# Patient Record
Sex: MALE | Race: WHITE | NOT HISPANIC OR LATINO | Employment: OTHER | URBAN - NONMETROPOLITAN AREA
[De-identification: names, ages, dates, MRNs, and addresses within clinical notes are randomized per-mention and may not be internally consistent; named-entity substitution may affect disease eponyms.]

---

## 2017-11-22 ENCOUNTER — TRANSCRIBE ORDERS (OUTPATIENT)
Dept: ADMINISTRATIVE | Facility: HOSPITAL | Age: 82
End: 2017-11-22

## 2017-11-22 DIAGNOSIS — M54.16 LUMBAR RADICULOPATHY: Primary | ICD-10-CM

## 2017-12-27 ENCOUNTER — HOSPITAL ENCOUNTER (OUTPATIENT)
Dept: NEUROLOGY | Facility: HOSPITAL | Age: 82
Discharge: HOME | End: 2017-12-27
Admitting: ORTHOPAEDIC SURGERY

## 2017-12-27 DIAGNOSIS — M54.16 LUMBAR RADICULOPATHY: ICD-10-CM

## 2017-12-27 PROCEDURE — 95886 MUSC TEST DONE W/N TEST COMP: CPT | Performed by: PSYCHIATRY & NEUROLOGY

## 2017-12-27 PROCEDURE — 95909 NRV CNDJ TST 5-6 STUDIES: CPT

## 2017-12-27 PROCEDURE — 95909 NRV CNDJ TST 5-6 STUDIES: CPT | Performed by: PSYCHIATRY & NEUROLOGY

## 2017-12-27 PROCEDURE — 95886 MUSC TEST DONE W/N TEST COMP: CPT

## 2018-02-26 ENCOUNTER — HOSPITAL ENCOUNTER (OUTPATIENT)
Dept: GENERAL RADIOLOGY | Facility: HOSPITAL | Age: 83
Discharge: HOME | End: 2018-02-26
Admitting: ORTHOPAEDIC SURGERY

## 2018-02-26 ENCOUNTER — APPOINTMENT (OUTPATIENT)
Dept: PREADMISSION TESTING | Facility: HOSPITAL | Age: 83
End: 2018-02-26

## 2018-02-26 VITALS
OXYGEN SATURATION: 99 % | DIASTOLIC BLOOD PRESSURE: 62 MMHG | HEART RATE: 80 BPM | SYSTOLIC BLOOD PRESSURE: 95 MMHG | HEIGHT: 68 IN | WEIGHT: 182.98 LBS | BODY MASS INDEX: 27.73 KG/M2 | RESPIRATION RATE: 16 BRPM

## 2018-02-26 LAB
ALBUMIN SERPL-MCNC: 4.1 G/DL (ref 3.5–5)
ALBUMIN/GLOB SERPL: 1.2 G/DL (ref 1.1–2.5)
ALP SERPL-CCNC: 76 U/L (ref 24–120)
ALT SERPL W P-5'-P-CCNC: 31 U/L (ref 0–54)
ANION GAP SERPL CALCULATED.3IONS-SCNC: 10 MMOL/L (ref 4–13)
APTT PPP: 32.5 SECONDS (ref 24.1–34.8)
AST SERPL-CCNC: 28 U/L (ref 7–45)
BACTERIA UR QL AUTO: ABNORMAL /HPF
BASOPHILS # BLD AUTO: 0.06 10*3/MM3 (ref 0–0.2)
BASOPHILS NFR BLD AUTO: 1.3 % (ref 0–2)
BILIRUB SERPL-MCNC: 0.6 MG/DL (ref 0.1–1)
BILIRUB UR QL STRIP: NEGATIVE
BUN BLD-MCNC: 9 MG/DL (ref 5–21)
BUN/CREAT SERPL: 13.8 (ref 7–25)
CALCIUM SPEC-SCNC: 9.3 MG/DL (ref 8.4–10.4)
CHLORIDE SERPL-SCNC: 95 MMOL/L (ref 98–110)
CLARITY UR: CLEAR
CO2 SERPL-SCNC: 31 MMOL/L (ref 24–31)
COLOR UR: YELLOW
CREAT BLD-MCNC: 0.65 MG/DL (ref 0.5–1.4)
DEPRECATED RDW RBC AUTO: 44.6 FL (ref 40–54)
EOSINOPHIL # BLD AUTO: 0.16 10*3/MM3 (ref 0–0.7)
EOSINOPHIL NFR BLD AUTO: 3.6 % (ref 0–4)
ERYTHROCYTE [DISTWIDTH] IN BLOOD BY AUTOMATED COUNT: 13.1 % (ref 12–15)
GFR SERPL CREATININE-BSD FRML MDRD: 118 ML/MIN/1.73
GLOBULIN UR ELPH-MCNC: 3.3 GM/DL
GLUCOSE BLD-MCNC: 108 MG/DL (ref 70–100)
GLUCOSE UR STRIP-MCNC: NEGATIVE MG/DL
HCT VFR BLD AUTO: 42.1 % (ref 40–52)
HGB BLD-MCNC: 14.4 G/DL (ref 14–18)
HGB UR QL STRIP.AUTO: ABNORMAL
HYALINE CASTS UR QL AUTO: ABNORMAL /LPF
IMM GRANULOCYTES # BLD: 0.01 10*3/MM3 (ref 0–0.03)
IMM GRANULOCYTES NFR BLD: 0.2 % (ref 0–5)
INR PPP: 1.06 (ref 0.91–1.09)
KETONES UR QL STRIP: NEGATIVE
LEUKOCYTE ESTERASE UR QL STRIP.AUTO: NEGATIVE
LYMPHOCYTES # BLD AUTO: 1.16 10*3/MM3 (ref 0.72–4.86)
LYMPHOCYTES NFR BLD AUTO: 25.9 % (ref 15–45)
MCH RBC QN AUTO: 31.9 PG (ref 28–32)
MCHC RBC AUTO-ENTMCNC: 34.2 G/DL (ref 33–36)
MCV RBC AUTO: 93.3 FL (ref 82–95)
MONOCYTES # BLD AUTO: 0.43 10*3/MM3 (ref 0.19–1.3)
MONOCYTES NFR BLD AUTO: 9.6 % (ref 4–12)
NEUTROPHILS # BLD AUTO: 2.66 10*3/MM3 (ref 1.87–8.4)
NEUTROPHILS NFR BLD AUTO: 59.4 % (ref 39–78)
NITRITE UR QL STRIP: NEGATIVE
NRBC BLD MANUAL-RTO: 0 /100 WBC (ref 0–0)
PH UR STRIP.AUTO: 7 [PH] (ref 5–8)
PLATELET # BLD AUTO: 210 10*3/MM3 (ref 130–400)
PMV BLD AUTO: 9 FL (ref 6–12)
POTASSIUM BLD-SCNC: 4.8 MMOL/L (ref 3.5–5.3)
PROT SERPL-MCNC: 7.4 G/DL (ref 6.3–8.7)
PROT UR QL STRIP: NEGATIVE
PROTHROMBIN TIME: 14.1 SECONDS (ref 11.9–14.6)
RBC # BLD AUTO: 4.51 10*6/MM3 (ref 4.8–5.9)
RBC # UR: ABNORMAL /HPF
REF LAB TEST METHOD: ABNORMAL
SODIUM BLD-SCNC: 136 MMOL/L (ref 135–145)
SP GR UR STRIP: 1.01 (ref 1–1.03)
SQUAMOUS #/AREA URNS HPF: ABNORMAL /HPF
UROBILINOGEN UR QL STRIP: ABNORMAL
WBC NRBC COR # BLD: 4.48 10*3/MM3 (ref 4.8–10.8)
WBC UR QL AUTO: ABNORMAL /HPF

## 2018-02-26 PROCEDURE — 85610 PROTHROMBIN TIME: CPT | Performed by: ORTHOPAEDIC SURGERY

## 2018-02-26 PROCEDURE — 85025 COMPLETE CBC W/AUTO DIFF WBC: CPT | Performed by: ORTHOPAEDIC SURGERY

## 2018-02-26 PROCEDURE — 93005 ELECTROCARDIOGRAM TRACING: CPT

## 2018-02-26 PROCEDURE — 71045 X-RAY EXAM CHEST 1 VIEW: CPT

## 2018-02-26 PROCEDURE — 81001 URINALYSIS AUTO W/SCOPE: CPT | Performed by: ORTHOPAEDIC SURGERY

## 2018-02-26 PROCEDURE — 85730 THROMBOPLASTIN TIME PARTIAL: CPT | Performed by: ORTHOPAEDIC SURGERY

## 2018-02-26 PROCEDURE — 36415 COLL VENOUS BLD VENIPUNCTURE: CPT

## 2018-02-26 PROCEDURE — 80053 COMPREHEN METABOLIC PANEL: CPT | Performed by: ORTHOPAEDIC SURGERY

## 2018-02-26 PROCEDURE — 93010 ELECTROCARDIOGRAM REPORT: CPT | Performed by: INTERNAL MEDICINE

## 2018-02-26 RX ORDER — CYCLOBENZAPRINE HCL 10 MG
10 TABLET ORAL 3 TIMES DAILY PRN
COMMUNITY

## 2018-02-26 RX ORDER — DIGOXIN 125 MCG
125 TABLET ORAL
COMMUNITY

## 2018-02-26 RX ORDER — LOSARTAN POTASSIUM 50 MG/1
50 TABLET ORAL DAILY
COMMUNITY

## 2018-02-26 RX ORDER — FUROSEMIDE 20 MG/1
20 TABLET ORAL 2 TIMES DAILY
COMMUNITY

## 2018-02-26 RX ORDER — ASPIRIN 81 MG/1
81 TABLET, CHEWABLE ORAL DAILY
COMMUNITY

## 2018-02-26 RX ORDER — HYDROCODONE BITARTRATE AND ACETAMINOPHEN 7.5; 325 MG/1; MG/1
1 TABLET ORAL EVERY 6 HOURS PRN
COMMUNITY
End: 2018-03-14 | Stop reason: HOSPADM

## 2018-02-26 NOTE — DISCHARGE INSTRUCTIONS
DAY OF SURGERY INSTRUCTIONS        YOUR SURGEON: DR DENNIS    PROCEDURE: LEFT LATERAL LUMBAR INTERBODY FUSION L3-L5    DATE OF SURGERY: MARCH 12, 2018    ARRIVAL TIME: AS DIRECTED BY OFFICE    DAY OF SURGERY TAKE ONLY THESE MEDICATIONS: METOPROLOL        BEFORE YOU COME TO THE HOSPITAL  (Pre-op instructions)  • Do not eat, drink, smoke or chew gum after midnight the night before surgery.  This also includes no mints.  • Morning of surgery take only the medicines you have been instructed with a sip of water unless otherwise instructed  by your physician.  • Do not shave, wear makeup or dark nail polish.  • Remove all jewelry including rings.  • Leave anything you consider valuable at home.  • Leave your suitcase in the car until after your surgery.  • Bring the following with you if applicable:  o Picture ID and insurance, Medicare or Medicaid cards  o Co-pay/deductible required by insurance (cash, check, credit card)  o Copy of advance directive, living will or power-of- documents if not brought to PAT  o CPAP or BIPAP mask and tubing  o Relaxation aids (MP3 player, book, magazine)  • On the day of surgery check in at registration located at the main entrance of the hospital.       Outpatient Surgery Guidelines, Adult  Outpatient procedures are those for which the person having the procedure is allowed to go home the same day as the procedure. Various procedures are done on an outpatient basis. You should follow some general guidelines if you will be having an outpatient procedure.  LET YOUR HEALTH CARE PROVIDER KNOW ABOUT:  · Any allergies you have.  · All medicines you are taking, including vitamins, herbs, eye drops, creams, and over-the-counter medicines.  · Previous problems you or members of your family have had with the use of anesthetics.  · Any blood disorders you have.  · Previous surgeries you have had.  · Medical conditions you have.  RISKS AND COMPLICATIONS  Your health care provider will  discuss possible risks and complications with you before surgery. Common risks and complications include:    · Problems due to the use of anesthetics.  · Blood loss and replacement (does not apply to minor surgical procedures).  · Temporary increase in pain due to surgery.  · Uncorrected pain or problems that the surgery was meant to correct.  · Infection.  · New damage.  BEFORE THE PROCEDURE  · Ask your health care provider about changing or stopping your regular medicines. You may need to stop taking certain medicines in the days or weeks before the procedure.  · Stop smoking at least 2 weeks before surgery. This lowers your risk for complications during and after surgery. Ask your health care provider for help with this if needed.  · Eat your usual meals and a light supper the day before surgery. Continue fluid intake. Do not drink alcohol.  · Do not eat or drink after midnight the night before your surgery.   · Arrange for someone to take you home and to stay with you for 24 hours after the procedure. Medicine given for your procedure may affect your ability to drive or to care for yourself.  · Call your health care provider's office if you develop an illness or problem that may prevent you from safely having your procedure.  AFTER THE PROCEDURE  After surgery, you will be taken to a recovery area, where your progress will be monitored. If there are no complications, you will be allowed to go home when you are awake, stable, and taking fluids well. You may have numbness around the surgical site. Healing will take some time. You will have tenderness at the surgical site and may have some swelling and bruising. You may also have some nausea.  HOME CARE INSTRUCTIONS  · Do not drive for 24 hours, or as directed by your health care provider. Do not drive while taking prescription pain medicines.  · Do not drink alcohol for 24 hours.  · Do not make important decisions or sign legal documents for 24 hours.  · You may  resume a normal diet and activities as directed.  · Do not lift anything heavier than 10 pounds (4.5 kg) or play contact sports until your health care provider says it is okay.  · Change your bandages (dressings) as directed.  · Only take over-the-counter or prescription medicines as directed by your health care provider.  · Follow up with your health care provider as directed.  SEEK MEDICAL CARE IF:  · You have increased bleeding (more than a small spot) from the surgical site.  · You have redness, swelling, or increasing pain in the wound.  · You see pus coming from the wound.  · You have a fever.  · You notice a bad smell coming from the wound or dressing.  · You feel lightheaded or faint.  · You develop a rash.  · You have trouble breathing.  · You develop allergies.  MAKE SURE YOU:  · Understand these instructions.  · Will watch your condition.  · Will get help right away if you are not doing well or get worse.     This information is not intended to replace advice given to you by your health care provider. Make sure you discuss any questions you have with your health care provider.     Document Released: 09/12/2002 Document Revised: 05/03/2016 Document Reviewed: 05/22/2014  [employer] Interactive Patient Education ©2016 [employer]       Fall Prevention in Hospitals, Adult  As a hospital patient, your condition and the treatments you receive can increase your risk for falls. Some additional risk factors for falls in a hospital include:  · Being in an unfamiliar environment.  · Being on bed rest.  · Your surgery.  · Taking certain medicines.  · Your tubing requirements, such as intravenous (IV) therapy or catheters.  It is important that you learn how to decrease fall risks while at the hospital. Below are important tips that can help prevent falls.  SAFETY TIPS FOR PREVENTING FALLS  Talk about your risk of falling.  · Ask your health care provider why you are at risk for falling. Is it your medicine, illness,  tubing placement, or something else?  · Make a plan with your health care provider to keep you safe from falls.  · Ask your health care provider or pharmacist about side effects of your medicines. Some medicines can make you dizzy or affect your coordination.  Ask for help.  · Ask for help before getting out of bed. You may need to press your call button.  · Ask for assistance in getting safely to the toilet.  · Ask for a walker or cane to be put at your bedside. Ask that most of the side rails on your bed be placed up before your health care provider leaves the room.  · Ask family or friends to sit with you.  · Ask for things that are out of your reach, such as your glasses, hearing aids, telephone, bedside table, or call button.  Follow these tips to avoid falling:  · Stay lying or seated, rather than standing, while waiting for help.  · Wear rubber-soled slippers or shoes whenever you walk in the hospital.  · Avoid quick, sudden movements.  ¨ Change positions slowly.  ¨ Sit on the side of your bed before standing.  ¨ Stand up slowly and wait before you start to walk.  · Let your health care provider know if there is a spill on the floor.  · Pay careful attention to the medical equipment, electrical cords, and tubes around you.  · When you need help, use your call button by your bed or in the bathroom. Wait for one of your health care providers to help you.  · If you feel dizzy or unsure of your footing, return to bed and wait for assistance.  · Avoid being distracted by the TV, telephone, or another person in your room.  · Do not lean or support yourself on rolling objects, such as IV poles or bedside tables.     This information is not intended to replace advice given to you by your health care provider. Make sure you discuss any questions you have with your health care provider.     Document Released: 12/15/2001 Document Revised: 01/08/2016 Document Reviewed: 08/25/2013  [employer] Interactive Patient Education  ©2016 [employer]       Surgical Site Infections FAQs  What is a Surgical Site Infection (SSI)?  A surgical site infection is an infection that occurs after surgery in the part of the body where the surgery took place. Most patients who have surgery do not develop an infection. However, infections develop in about 1 to 3 out of every 100 patients who have surgery.  Some of the common symptoms of a surgical site infection are:  · Redness and pain around the area where you had surgery  · Drainage of cloudy fluid from your surgical wound  · Fever  Can SSIs be treated?  Yes. Most surgical site infections can be treated with antibiotics. The antibiotic given to you depends on the bacteria (germs) causing the infection. Sometimes patients with SSIs also need another surgery to treat the infection.  What are some of the things that hospitals are doing to prevent SSIs?  To prevent SSIs, doctors, nurses, and other healthcare providers:  · Clean their hands and arms up to their elbows with an antiseptic agent just before the surgery.  · Clean their hands with soap and water or an alcohol-based hand rub before and after caring for each patient.  · May remove some of your hair immediately before your surgery using electric clippers if the hair is in the same area where the procedure will occur. They should not shave you with a razor.  · Wear special hair covers, masks, gowns, and gloves during surgery to keep the surgery area clean.  · Give you antibiotics before your surgery starts. In most cases, you should get antibiotics within 60 minutes before the surgery starts and the antibiotics should be stopped within 24 hours after surgery.  · Clean the skin at the site of your surgery with a special soap that kills germs.  What can I do to help prevent SSIs?  Before your surgery:  · Tell your doctor about other medical problems you may have. Health problems such as allergies, diabetes, and obesity could affect your surgery and  your treatment.  · Quit smoking. Patients who smoke get more infections. Talk to your doctor about how you can quit before your surgery.  · Do not shave near where you will have surgery. Shaving with a razor can irritate your skin and make it easier to develop an infection.  At the time of your surgery:  · Speak up if someone tries to shave you with a razor before surgery. Ask why you need to be shaved and talk with your surgeon if you have any concerns.  · Ask if you will get antibiotics before surgery.  After your surgery:  · Make sure that your healthcare providers clean their hands before examining you, either with soap and water or an alcohol-based hand rub.  · If you do not see your providers clean their hands, please ask them to do so.  · Family and friends who visit you should not touch the surgical wound or dressings.  · Family and friends should clean their hands with soap and water or an alcohol-based hand rub before and after visiting you. If you do not see them clean their hands, ask them to clean their hands.  What do I need to do when I go home from the hospital?  · Before you go home, your doctor or nurse should explain everything you need to know about taking care of your wound. Make sure you understand how to care for your wound before you leave the hospital.  · Always clean your hands before and after caring for your wound.  · Before you go home, make sure you know who to contact if you have questions or problems after you get home.  · If you have any symptoms of an infection, such as redness and pain at the surgery site, drainage, or fever, call your doctor immediately.  If you have additional questions, please ask your doctor or nurse.  Developed and co-sponsored by The Society for Healthcare Epidemiology of [place] (SHEA); Infectious Diseases Society of [place] (IDSA); American Hospital Association; Association for Professionals in Infection Control and Epidemiology (APIC); Centers for Disease  Control and Prevention (CDC); and The Joint Commission.     This information is not intended to replace advice given to you by your health care provider. Make sure you discuss any questions you have with your health care provider.     Document Released: 12/23/2014 Document Revised: 01/08/2016 Document Reviewed: 03/02/2016  [employer] Interactive Patient Education ©2016 [employer]       [facility]  CHG 4% Patient Instruction Sheet    Preparing the Skin Before Surgery  Preparing or “prepping” skin before surgery can reduce the risk of infection at the surgical site. To make the process easier,[facility] has chosen 4% Chlorhexidine Gluconate (CHG) antiseptic solution.   The steps below outline the prepping process and should be carefully followed.                                                                                                                                                      Prep the skin at the following time(s):                                                      We recommend you shower the night before surgery, and again the morning of surgery with the 4% CHG antiseptic solution using half of the bottle and a cloth each time.  Dress in clean clothes/sleepwear after showering.  See instructions below for application.          Do not apply any lotions or moisturizers.       Do not shave the area to be prepped for at least 2 days prior to surgery.    Clipping the hair may be done immediately prior to your surgery at the hospital    if needed.    Directions:  Thoroughly rinse your body with water.  Apply 4% CHG to a cloth and wash skin gently, paying special attention to the operative site.  Rinse again thoroughly.  Once you have begun using this product do not apply anything else to your skin. If itching or redness persists, rinse affected areas and discontinue use.    When using this product:  • Keep out of eyes, ears, and mouth.  • If solution should contact these areas, rinse out promptly  and thoroughly with water.  • For external use only.  • Do not use in genital area, on your face or head.      PATIENT/FAMILY/RESPONSIBLE PARTY VERBALIZES UNDERSTANDING OF ABOVE EDUCATION.  COPY OF PAIN SCALE GIVEN AND REVIEWED WITH VERBALIZED UNDERSTANDING.

## 2018-02-26 NOTE — NURSING NOTE
PT PRESSURE IN PREWORK 95/62 HE STATES THAT HE JUST TOOK HIS BLOOD PRESSURE MEDICATIONS AND THAT HE RUNS THIS NORMALLY IN THE MORNING UNTIL MID AFTERNOON. THIS IS HIS BASELINE. HE ALSO STATES THAT HE HAS A HX OF A FIB AS SHOWN ON HIS EKG. HE IS ON ASPIRIN BUT REFUSES BLOOD THINNERS. I REQUESTED THAT HE SPEAK WITH DR PENALOZA AND DR DENNIS PRIOR TO SURGERY ABOUT WHETHER THEY WISH HIM TO STOP ASPIRIN PRIOR TO SURGERY. HE SAID HE WOULD SPEAK TO BOTH

## 2018-03-09 ENCOUNTER — ANESTHESIA EVENT (OUTPATIENT)
Dept: PERIOP | Facility: HOSPITAL | Age: 83
End: 2018-03-09

## 2018-03-12 ENCOUNTER — ANESTHESIA (OUTPATIENT)
Dept: PERIOP | Facility: HOSPITAL | Age: 83
End: 2018-03-12

## 2018-03-12 ENCOUNTER — HOSPITAL ENCOUNTER (INPATIENT)
Facility: HOSPITAL | Age: 83
LOS: 2 days | Discharge: HOME HEALTH | End: 2018-03-14
Attending: ORTHOPAEDIC SURGERY | Admitting: ORTHOPAEDIC SURGERY

## 2018-03-12 ENCOUNTER — APPOINTMENT (OUTPATIENT)
Dept: GENERAL RADIOLOGY | Facility: HOSPITAL | Age: 83
End: 2018-03-12

## 2018-03-12 DIAGNOSIS — Z78.9 IMPAIRED MOBILITY AND ADLS: ICD-10-CM

## 2018-03-12 DIAGNOSIS — Z74.09 IMPAIRED MOBILITY AND ADLS: ICD-10-CM

## 2018-03-12 DIAGNOSIS — R26.89 IMPAIRED GAIT AND MOBILITY: ICD-10-CM

## 2018-03-12 PROBLEM — M48.061 LUMBAR STENOSIS: Status: ACTIVE | Noted: 2018-03-12

## 2018-03-12 LAB
ABO GROUP BLD: NORMAL
BLD GP AB SCN SERPL QL: NEGATIVE
RH BLD: POSITIVE

## 2018-03-12 PROCEDURE — 25010000002 PROPOFOL 10 MG/ML EMULSION: Performed by: NURSE ANESTHETIST, CERTIFIED REGISTERED

## 2018-03-12 PROCEDURE — 86900 BLOOD TYPING SEROLOGIC ABO: CPT | Performed by: ORTHOPAEDIC SURGERY

## 2018-03-12 PROCEDURE — L0631 LSO SAG R AN/POS PNL PRE CST: HCPCS

## 2018-03-12 PROCEDURE — 76000 FLUOROSCOPY <1 HR PHYS/QHP: CPT

## 2018-03-12 PROCEDURE — 25010000002 PROPOFOL 1000 MG/ML EMULSION: Performed by: NURSE ANESTHETIST, CERTIFIED REGISTERED

## 2018-03-12 PROCEDURE — C1713 ANCHOR/SCREW BN/BN,TIS/BN: HCPCS | Performed by: ORTHOPAEDIC SURGERY

## 2018-03-12 PROCEDURE — 25010000002 HYDROMORPHONE PER 4 MG: Performed by: ANESTHESIOLOGY

## 2018-03-12 PROCEDURE — 0SG10A0 FUSION OF 2 OR MORE LUMBAR VERTEBRAL JOINTS WITH INTERBODY FUSION DEVICE, ANTERIOR APPROACH, ANTERIOR COLUMN, OPEN APPROACH: ICD-10-PCS | Performed by: ORTHOPAEDIC SURGERY

## 2018-03-12 PROCEDURE — 25010000002 HEPARIN (PORCINE) PER 1000 UNITS: Performed by: ORTHOPAEDIC SURGERY

## 2018-03-12 PROCEDURE — 25010000002 HYDROMORPHONE PER 4 MG: Performed by: ORTHOPAEDIC SURGERY

## 2018-03-12 PROCEDURE — 25010000002 HYDROMORPHONE PER 4 MG: Performed by: NURSE ANESTHETIST, CERTIFIED REGISTERED

## 2018-03-12 PROCEDURE — 25010000002 SUCCINYLCHOLINE PER 20 MG: Performed by: NURSE ANESTHETIST, CERTIFIED REGISTERED

## 2018-03-12 PROCEDURE — 94799 UNLISTED PULMONARY SVC/PX: CPT

## 2018-03-12 PROCEDURE — 25010000002 FENTANYL CITRATE (PF) 250 MCG/5ML SOLUTION: Performed by: NURSE ANESTHETIST, CERTIFIED REGISTERED

## 2018-03-12 PROCEDURE — 86850 RBC ANTIBODY SCREEN: CPT | Performed by: ORTHOPAEDIC SURGERY

## 2018-03-12 PROCEDURE — 0SB20ZZ EXCISION OF LUMBAR VERTEBRAL DISC, OPEN APPROACH: ICD-10-PCS | Performed by: ORTHOPAEDIC SURGERY

## 2018-03-12 PROCEDURE — 86901 BLOOD TYPING SEROLOGIC RH(D): CPT | Performed by: ORTHOPAEDIC SURGERY

## 2018-03-12 PROCEDURE — 72100 X-RAY EXAM L-S SPINE 2/3 VWS: CPT

## 2018-03-12 DEVICE — IMPLANTABLE DEVICE: Type: IMPLANTABLE DEVICE | Site: SPINE LUMBAR | Status: FUNCTIONAL

## 2018-03-12 DEVICE — BONE FILLER VOID NANOSS BIOACTIVE 3D 20CC: Type: IMPLANTABLE DEVICE | Site: SPINE LUMBAR | Status: FUNCTIONAL

## 2018-03-12 RX ORDER — SODIUM CHLORIDE, SODIUM LACTATE, POTASSIUM CHLORIDE, CALCIUM CHLORIDE 600; 310; 30; 20 MG/100ML; MG/100ML; MG/100ML; MG/100ML
30 INJECTION, SOLUTION INTRAVENOUS CONTINUOUS
Status: DISCONTINUED | OUTPATIENT
Start: 2018-03-12 | End: 2018-03-12

## 2018-03-12 RX ORDER — SODIUM CHLORIDE 9 MG/ML
75 INJECTION, SOLUTION INTRAVENOUS CONTINUOUS
Status: DISPENSED | OUTPATIENT
Start: 2018-03-12 | End: 2018-03-13

## 2018-03-12 RX ORDER — MEPERIDINE HYDROCHLORIDE 25 MG/ML
12.5 INJECTION INTRAMUSCULAR; INTRAVENOUS; SUBCUTANEOUS
Status: DISCONTINUED | OUTPATIENT
Start: 2018-03-12 | End: 2018-03-12 | Stop reason: HOSPADM

## 2018-03-12 RX ORDER — ONDANSETRON 2 MG/ML
4 INJECTION INTRAMUSCULAR; INTRAVENOUS ONCE AS NEEDED
Status: DISCONTINUED | OUTPATIENT
Start: 2018-03-12 | End: 2018-03-12 | Stop reason: HOSPADM

## 2018-03-12 RX ORDER — FENTANYL CITRATE 50 UG/ML
25 INJECTION, SOLUTION INTRAMUSCULAR; INTRAVENOUS
Status: DISCONTINUED | OUTPATIENT
Start: 2018-03-12 | End: 2018-03-12 | Stop reason: HOSPADM

## 2018-03-12 RX ORDER — HEPARIN SODIUM 10000 [USP'U]/ML
INJECTION, SOLUTION INTRAVENOUS; SUBCUTANEOUS AS NEEDED
Status: DISCONTINUED | OUTPATIENT
Start: 2018-03-12 | End: 2018-03-12 | Stop reason: HOSPADM

## 2018-03-12 RX ORDER — ONDANSETRON 4 MG/1
4 TABLET, ORALLY DISINTEGRATING ORAL EVERY 6 HOURS PRN
Status: DISCONTINUED | OUTPATIENT
Start: 2018-03-12 | End: 2018-03-14 | Stop reason: HOSPADM

## 2018-03-12 RX ORDER — DIPHENHYDRAMINE HCL 25 MG
25 CAPSULE ORAL NIGHTLY PRN
Status: DISCONTINUED | OUTPATIENT
Start: 2018-03-12 | End: 2018-03-14 | Stop reason: HOSPADM

## 2018-03-12 RX ORDER — FAMOTIDINE 20 MG/1
20 TABLET, FILM COATED ORAL EVERY 12 HOURS SCHEDULED
Status: DISCONTINUED | OUTPATIENT
Start: 2018-03-12 | End: 2018-03-14 | Stop reason: HOSPADM

## 2018-03-12 RX ORDER — LIDOCAINE HYDROCHLORIDE 20 MG/ML
INJECTION, SOLUTION INFILTRATION; PERINEURAL AS NEEDED
Status: DISCONTINUED | OUTPATIENT
Start: 2018-03-12 | End: 2018-03-12 | Stop reason: SURG

## 2018-03-12 RX ORDER — OXYCODONE HCL 20 MG/1
20 TABLET, FILM COATED, EXTENDED RELEASE ORAL ONCE
Status: COMPLETED | OUTPATIENT
Start: 2018-03-12 | End: 2018-03-12

## 2018-03-12 RX ORDER — FAMOTIDINE 10 MG/ML
20 INJECTION, SOLUTION INTRAVENOUS EVERY 12 HOURS SCHEDULED
Status: DISCONTINUED | OUTPATIENT
Start: 2018-03-12 | End: 2018-03-14 | Stop reason: HOSPADM

## 2018-03-12 RX ORDER — ROCURONIUM BROMIDE 10 MG/ML
INJECTION, SOLUTION INTRAVENOUS AS NEEDED
Status: DISCONTINUED | OUTPATIENT
Start: 2018-03-12 | End: 2018-03-12 | Stop reason: SURG

## 2018-03-12 RX ORDER — LOSARTAN POTASSIUM 50 MG/1
50 TABLET ORAL DAILY
Status: DISCONTINUED | OUTPATIENT
Start: 2018-03-12 | End: 2018-03-14 | Stop reason: HOSPADM

## 2018-03-12 RX ORDER — NALOXONE HCL 0.4 MG/ML
0.4 VIAL (ML) INJECTION AS NEEDED
Status: DISCONTINUED | OUTPATIENT
Start: 2018-03-12 | End: 2018-03-12 | Stop reason: HOSPADM

## 2018-03-12 RX ORDER — MAGNESIUM HYDROXIDE 1200 MG/15ML
LIQUID ORAL AS NEEDED
Status: DISCONTINUED | OUTPATIENT
Start: 2018-03-12 | End: 2018-03-12 | Stop reason: HOSPADM

## 2018-03-12 RX ORDER — SODIUM CHLORIDE 0.9 % (FLUSH) 0.9 %
1-10 SYRINGE (ML) INJECTION AS NEEDED
Status: DISCONTINUED | OUTPATIENT
Start: 2018-03-12 | End: 2018-03-12

## 2018-03-12 RX ORDER — DIGOXIN 125 MCG
125 TABLET ORAL
Status: DISCONTINUED | OUTPATIENT
Start: 2018-03-12 | End: 2018-03-14 | Stop reason: HOSPADM

## 2018-03-12 RX ORDER — SODIUM CHLORIDE 0.9 % (FLUSH) 0.9 %
1-10 SYRINGE (ML) INJECTION AS NEEDED
Status: DISCONTINUED | OUTPATIENT
Start: 2018-03-12 | End: 2018-03-14 | Stop reason: HOSPADM

## 2018-03-12 RX ORDER — ONDANSETRON 2 MG/ML
4 INJECTION INTRAMUSCULAR; INTRAVENOUS EVERY 6 HOURS PRN
Status: DISCONTINUED | OUTPATIENT
Start: 2018-03-12 | End: 2018-03-12 | Stop reason: SDUPTHER

## 2018-03-12 RX ORDER — IPRATROPIUM BROMIDE AND ALBUTEROL SULFATE 2.5; .5 MG/3ML; MG/3ML
3 SOLUTION RESPIRATORY (INHALATION) ONCE AS NEEDED
Status: DISCONTINUED | OUTPATIENT
Start: 2018-03-12 | End: 2018-03-12 | Stop reason: HOSPADM

## 2018-03-12 RX ORDER — FUROSEMIDE 20 MG/1
20 TABLET ORAL
Status: DISCONTINUED | OUTPATIENT
Start: 2018-03-12 | End: 2018-03-14 | Stop reason: HOSPADM

## 2018-03-12 RX ORDER — LABETALOL HYDROCHLORIDE 5 MG/ML
5 INJECTION, SOLUTION INTRAVENOUS
Status: DISCONTINUED | OUTPATIENT
Start: 2018-03-12 | End: 2018-03-12 | Stop reason: HOSPADM

## 2018-03-12 RX ORDER — HYDROMORPHONE HCL 110MG/55ML
PATIENT CONTROLLED ANALGESIA SYRINGE INTRAVENOUS AS NEEDED
Status: DISCONTINUED | OUTPATIENT
Start: 2018-03-12 | End: 2018-03-12 | Stop reason: SURG

## 2018-03-12 RX ORDER — DEXTROSE MONOHYDRATE 25 G/50ML
12.5 INJECTION, SOLUTION INTRAVENOUS AS NEEDED
Status: DISCONTINUED | OUTPATIENT
Start: 2018-03-12 | End: 2018-03-14 | Stop reason: HOSPADM

## 2018-03-12 RX ORDER — SODIUM CHLORIDE, SODIUM LACTATE, POTASSIUM CHLORIDE, CALCIUM CHLORIDE 600; 310; 30; 20 MG/100ML; MG/100ML; MG/100ML; MG/100ML
100 INJECTION, SOLUTION INTRAVENOUS CONTINUOUS PRN
Status: DISCONTINUED | OUTPATIENT
Start: 2018-03-12 | End: 2018-03-12 | Stop reason: HOSPADM

## 2018-03-12 RX ORDER — SODIUM CHLORIDE 0.9 % (FLUSH) 0.9 %
1-10 SYRINGE (ML) INJECTION AS NEEDED
Status: DISCONTINUED | OUTPATIENT
Start: 2018-03-12 | End: 2018-03-12 | Stop reason: HOSPADM

## 2018-03-12 RX ORDER — MIDAZOLAM HYDROCHLORIDE 1 MG/ML
1 INJECTION INTRAMUSCULAR; INTRAVENOUS
Status: DISCONTINUED | OUTPATIENT
Start: 2018-03-12 | End: 2018-03-12 | Stop reason: HOSPADM

## 2018-03-12 RX ORDER — PROPOFOL 10 MG/ML
VIAL (ML) INTRAVENOUS AS NEEDED
Status: DISCONTINUED | OUTPATIENT
Start: 2018-03-12 | End: 2018-03-12 | Stop reason: SURG

## 2018-03-12 RX ORDER — MIDAZOLAM HYDROCHLORIDE 1 MG/ML
2 INJECTION INTRAMUSCULAR; INTRAVENOUS
Status: DISCONTINUED | OUTPATIENT
Start: 2018-03-12 | End: 2018-03-12 | Stop reason: HOSPADM

## 2018-03-12 RX ORDER — FENTANYL CITRATE 50 UG/ML
INJECTION, SOLUTION INTRAMUSCULAR; INTRAVENOUS AS NEEDED
Status: DISCONTINUED | OUTPATIENT
Start: 2018-03-12 | End: 2018-03-12 | Stop reason: SURG

## 2018-03-12 RX ORDER — HYDRALAZINE HYDROCHLORIDE 20 MG/ML
5 INJECTION INTRAMUSCULAR; INTRAVENOUS
Status: DISCONTINUED | OUTPATIENT
Start: 2018-03-12 | End: 2018-03-12 | Stop reason: HOSPADM

## 2018-03-12 RX ORDER — ONDANSETRON 2 MG/ML
4 INJECTION INTRAMUSCULAR; INTRAVENOUS EVERY 6 HOURS PRN
Status: DISCONTINUED | OUTPATIENT
Start: 2018-03-12 | End: 2018-03-14 | Stop reason: HOSPADM

## 2018-03-12 RX ORDER — DIPHENHYDRAMINE HYDROCHLORIDE 50 MG/ML
12.5 INJECTION INTRAMUSCULAR; INTRAVENOUS
Status: DISCONTINUED | OUTPATIENT
Start: 2018-03-12 | End: 2018-03-12 | Stop reason: HOSPADM

## 2018-03-12 RX ORDER — SODIUM CHLORIDE, SODIUM LACTATE, POTASSIUM CHLORIDE, CALCIUM CHLORIDE 600; 310; 30; 20 MG/100ML; MG/100ML; MG/100ML; MG/100ML
9 INJECTION, SOLUTION INTRAVENOUS CONTINUOUS
Status: DISCONTINUED | OUTPATIENT
Start: 2018-03-12 | End: 2018-03-12 | Stop reason: HOSPADM

## 2018-03-12 RX ORDER — ONDANSETRON 4 MG/1
4 TABLET, FILM COATED ORAL EVERY 6 HOURS PRN
Status: DISCONTINUED | OUTPATIENT
Start: 2018-03-12 | End: 2018-03-14 | Stop reason: HOSPADM

## 2018-03-12 RX ORDER — CYCLOBENZAPRINE HCL 10 MG
10 TABLET ORAL 3 TIMES DAILY PRN
Status: DISCONTINUED | OUTPATIENT
Start: 2018-03-12 | End: 2018-03-14 | Stop reason: HOSPADM

## 2018-03-12 RX ORDER — SODIUM CHLORIDE 9 MG/ML
75 INJECTION, SOLUTION INTRAVENOUS CONTINUOUS
Status: DISPENSED | OUTPATIENT
Start: 2018-03-12 | End: 2018-03-14

## 2018-03-12 RX ORDER — MORPHINE SULFATE 2 MG/ML
2 INJECTION, SOLUTION INTRAMUSCULAR; INTRAVENOUS
Status: DISCONTINUED | OUTPATIENT
Start: 2018-03-12 | End: 2018-03-12 | Stop reason: HOSPADM

## 2018-03-12 RX ORDER — SUCCINYLCHOLINE CHLORIDE 20 MG/ML
INJECTION INTRAMUSCULAR; INTRAVENOUS AS NEEDED
Status: DISCONTINUED | OUTPATIENT
Start: 2018-03-12 | End: 2018-03-12 | Stop reason: SURG

## 2018-03-12 RX ORDER — OXYCODONE AND ACETAMINOPHEN 10; 325 MG/1; MG/1
2 TABLET ORAL EVERY 4 HOURS PRN
Status: DISCONTINUED | OUTPATIENT
Start: 2018-03-12 | End: 2018-03-14 | Stop reason: HOSPADM

## 2018-03-12 RX ADMIN — FENTANYL CITRATE 100 MCG: 50 INJECTION INTRAMUSCULAR; INTRAVENOUS at 09:41

## 2018-03-12 RX ADMIN — HYDROMORPHONE HYDROCHLORIDE 0.25 MG: 2 INJECTION, SOLUTION INTRAMUSCULAR; INTRAVENOUS; SUBCUTANEOUS at 10:50

## 2018-03-12 RX ADMIN — CYCLOBENZAPRINE HYDROCHLORIDE 10 MG: 10 TABLET, FILM COATED ORAL at 17:47

## 2018-03-12 RX ADMIN — PROPOFOL 130 MCG/KG/MIN: 10 INJECTION, EMULSION INTRAVENOUS at 11:16

## 2018-03-12 RX ADMIN — FAMOTIDINE 20 MG: 10 INJECTION INTRAVENOUS at 14:27

## 2018-03-12 RX ADMIN — METOPROLOL TARTRATE 25 MG: 25 TABLET, FILM COATED ORAL at 21:46

## 2018-03-12 RX ADMIN — HYDROMORPHONE HYDROCHLORIDE 0.5 MG: 1 INJECTION, SOLUTION INTRAMUSCULAR; INTRAVENOUS; SUBCUTANEOUS at 12:22

## 2018-03-12 RX ADMIN — LIDOCAINE HYDROCHLORIDE 0.5 ML: 10 INJECTION, SOLUTION EPIDURAL; INFILTRATION; INTRACAUDAL; PERINEURAL at 08:33

## 2018-03-12 RX ADMIN — SODIUM CHLORIDE, POTASSIUM CHLORIDE, SODIUM LACTATE AND CALCIUM CHLORIDE 100 ML/HR: 600; 310; 30; 20 INJECTION, SOLUTION INTRAVENOUS at 08:35

## 2018-03-12 RX ADMIN — LOSARTAN POTASSIUM 50 MG: 50 TABLET ORAL at 14:27

## 2018-03-12 RX ADMIN — DIGOXIN 125 MCG: 0.12 TABLET ORAL at 14:27

## 2018-03-12 RX ADMIN — LIDOCAINE HYDROCHLORIDE 100 MG: 20 INJECTION, SOLUTION INFILTRATION; PERINEURAL at 09:41

## 2018-03-12 RX ADMIN — FENTANYL CITRATE 100 MCG: 50 INJECTION INTRAMUSCULAR; INTRAVENOUS at 10:10

## 2018-03-12 RX ADMIN — OXYCODONE HYDROCHLORIDE 20 MG: 20 TABLET, FILM COATED, EXTENDED RELEASE ORAL at 08:42

## 2018-03-12 RX ADMIN — SUCCINYLCHOLINE CHLORIDE 120 MG: 20 INJECTION, SOLUTION INTRAMUSCULAR; INTRAVENOUS at 09:41

## 2018-03-12 RX ADMIN — OXYCODONE HYDROCHLORIDE AND ACETAMINOPHEN 2 TABLET: 10; 325 TABLET ORAL at 21:49

## 2018-03-12 RX ADMIN — CEFAZOLIN SODIUM 1 G: 1 INJECTION, POWDER, FOR SOLUTION INTRAMUSCULAR; INTRAVENOUS at 17:49

## 2018-03-12 RX ADMIN — FENTANYL CITRATE 50 MCG: 50 INJECTION INTRAMUSCULAR; INTRAVENOUS at 10:25

## 2018-03-12 RX ADMIN — FAMOTIDINE 20 MG: 10 INJECTION INTRAVENOUS at 21:46

## 2018-03-12 RX ADMIN — HYDROMORPHONE HYDROCHLORIDE 1 MG: 1 INJECTION, SOLUTION INTRAMUSCULAR; INTRAVENOUS; SUBCUTANEOUS at 14:47

## 2018-03-12 RX ADMIN — PROPOFOL 125 MCG/KG/MIN: 10 INJECTION, EMULSION INTRAVENOUS at 10:08

## 2018-03-12 RX ADMIN — OXYCODONE HYDROCHLORIDE AND ACETAMINOPHEN 1 TABLET: 10; 325 TABLET ORAL at 17:48

## 2018-03-12 RX ADMIN — HYDROMORPHONE HYDROCHLORIDE 0.5 MG: 1 INJECTION, SOLUTION INTRAMUSCULAR; INTRAVENOUS; SUBCUTANEOUS at 12:27

## 2018-03-12 RX ADMIN — Medication 2 G: at 10:07

## 2018-03-12 RX ADMIN — SODIUM CHLORIDE, POTASSIUM CHLORIDE, SODIUM LACTATE AND CALCIUM CHLORIDE 30 ML/HR: 600; 310; 30; 20 INJECTION, SOLUTION INTRAVENOUS at 08:42

## 2018-03-12 RX ADMIN — SODIUM CHLORIDE 75 ML/HR: 9 INJECTION, SOLUTION INTRAVENOUS at 13:45

## 2018-03-12 RX ADMIN — PROPOFOL 150 MG: 10 INJECTION, EMULSION INTRAVENOUS at 09:41

## 2018-03-12 RX ADMIN — FUROSEMIDE 20 MG: 20 TABLET ORAL at 17:48

## 2018-03-12 RX ADMIN — ROCURONIUM BROMIDE 10 MG: 10 INJECTION INTRAVENOUS at 09:41

## 2018-03-12 NOTE — OP NOTE
LUMBAR LATERAL INTERBODY FUSION  Procedure Note    Denver G Morris  3/12/2018    Pre-op Diagnosis:     1.  Increasing chronic back pain  2.  Bilateral buttock, thigh, and leg radiculopathy  3.  Severe neurogenic claudication  4.  Multilevel lumbar degenerative disc disease, L2 to S1, worse L3-4  5.  Loss of lumbar lordosis  6.  Degenerative lumbar scoliosis  7.  Multilevel lumbar facet arthropathy  8.  Retrolisthesis L3-4  9.  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  Left Lateral Lumbar Interbody Fusion L3-4, L4-5  2.  Use of PEEK interbody biomechanical device for fusion L3-4, L4-5 (Lanx PEEK spacer x 2)  3.  Use of bone marrow aspirate obtained through separate incision for fusion  4.  Use of allograft bone chips for fusion  5.  Use of fluoroscopy for confirmation of surgical level, placement of PEEK spacers and instrumentation  6.  Intraoperative neural monitoring    Anesthesia: General    Surgeon: KEVIN Larsen MD    Assistant: Feroz Rashid PA-C    Estimated Blood Loss: Minimal    Complications: None    Condition: Stable to PACU.    Indications:    The patient is an 82-year-old who sees Dr. Jony Dougherty for medical issues.  He presented to the office with increasing chronic back pain as well as symptoms down both lower extremities.  His symptoms were consistent with severe neurogenic claudication as well as radiculopathy.  Imaging studies revealed degenerative changes throughout the lumbar spine worse at L3-4, where there was retrolisthesis.  He had degenerative lumbar scoliosis as well as a loss of lumbar lordosis along with facet arthropathy causing severe foraminal stenosis worse at L3-4 and L4-5.     After failing conservative measures, it was mutually decided that surgery would be the best option. Risks, benefits, and complications of surgery were discussed with the patient. The patient appeared well informed and wished to proceed. We  specifically discussed the risk of infection, blood loss, nerve root injury, CSF leak, and the possibility of incomplete resolution of symptoms. We also discussed the possible risk of a nonunion and the potential need for additional surgery in the event of a pseudoarthrosis or hardware failure.    Operative Procedure:    After obtaining informed consent and verifying the correct operative site, the patient was brought to the operating room and placed supine on operating table.  A general anesthetic was provided by the anesthesia service with the assistance of an endotracheal tube.  Once this was appropriately positioned and secured, the patient was carefully rotated into the lateral decubitus position with the left side up.  All bony prominences were well-padded.  3 inch wide cloth tape was used to maintain the patient's position.  It was also used to tip open the pelvis slightly allowing better access to the lumbar spine through a lateral approach.  Fluoroscopy was then used to identify the L3-4 and L4-5 levels, and the skin was marked for our planned incision.  The left flank was then prepped and draped in the usual sterile fashion.  A surgical timeout was taken to confirm this was the correct patient, we were working at the correct level, and that preoperative antibiotics were given in a timely fashion.    A small stab incision was created over the left anterior iliac crest using a 15 blade scalpel.  Through this stab incision, a Jamshidi needle was advanced into the iliac crest.  Through the needle we aspirated approximately 50-60 mL of bone marrow from the iliac crest.  This bone marrow aspirate was then passed off in a sterile fashion for processing and concentration to be later mixed with allograft bone chips to assist with obtaining a fusion.    An oblique incision was created of the left flank using a 10 blade scalpel directly centered between the L3-4 and L4-5 segments. Dissection was carried bluntly  through subcutaneous tissues. Blunt dissection was also carried between the external oblique and internal oblique musculature. The transversalis fascia was pierced allowing access to the retro-peroneal space. At this point, a series of neuro monitoring probes were used to safely access the L4-5 level. We used stimulated and free run EMG for this purpose. Once nerve roots were confirmed to be out of harm's way, self retaining retractors were placed for continuous exposure.     An annulotomy was then created at the L4-5 area using a 15 blade scalpel on a long handle. An angled Moran elevator was used to remove disc material off of the endplates. Disc material was removed using Kerrisons, pituitaries, and forward angled curettes. Once all disc material had been retrieved, I used the angled Moran elevator to divide the contralateral annulus. This assisted with mobilization of the vertebral body. We then used a series of endplate scrapers to prepare the endplates for interbody fusion. Trial spacers were malleted into position and for the disc space it was felt that a 12 mm x 60 mm implant would be the best fit to restore disc height. The disc space was then thoroughly irrigated with saline solution.     A PEEK spacer from the [employer] instrumentation set measuring 12 mm x 60 mm x 22 mm was then packed as tightly as possible with a combination of the previously obtained bone marrow aspirate and allograft bone chips. This PEEK spacer was then malleted into the L4-5 disc space under fluoroscopic guidance. It was placed as a PEEK interbody biomechanical device to assist with interbody fusion.  After confirming the spacer was properly positioned in both the AP and lateral projections, next attention was turned to the L3-4 segment.    The neuro monitoring probes were once again used this time to access L3-4.  Once nerve roots were confirmed to be out of harm's way, self retaining retractors were placed for continuous exposure of  L3-4.  This disc space was prepared in an identical fashion as L4-5.  We used first the 15 blade scalpel to create an annulotomy.  A Moran elevators were used to remove disc material off of the endplates.  Disc material was removed using Kerrisons, pituitaries, and forward angled curettes.  Endplate scrapers were used to prepare the endplates for interbody fusion and the contralateral annulus was divided with the Moran elevator.  Trial spacers were then malleted into position across L3-4.  A second PEEK spacer was then chosen matching the final trial.  In this case we used a 10 mm x 55 mm implant.  This spacer was packed as tightly as possible with a combination of allograft bone and bone marrow aspirate.  This spacer was malleted into the L3-4 disc space under fluoroscopic guidance as a PEEK interbody biomechanical device to assist with interbody fusion.     The wound was then irrigated thoroughly. A thorough inspection was then undertaken to ensure that we had adequate hemostasis. Bleeding at this point was controlled using thrombin Gelfoam powder and bipolar cautery. Closure was then accomplished with a #1 Vicryl reapproximating the transversalis fascia as well as the internal and external oblique musculature. Immediate subcutaneous tissues were closed with a 3-0 Vicryl and skin closure was accomplished using Mastisol and Steri-Strips. The wound was then sterilely dressed. The patient was then carefully rotated supine onto a hospital gurney, extubated, and sent to the recovery room in good stable condition.     The patient tolerated the procedure well. There were no complications. We estimated blood loss to be minimal. The patient remained hemodynamically stable.     Feroz Rashid PA-C provided critical assistance during the procedure. His assistance was medically necessary in order to allow the procedure to occur in the most safe and efficient manner.    KEVIN Larsen MD     Date: 3/12/2018  Time: 4:41  PM

## 2018-03-12 NOTE — PLAN OF CARE
Problem: Patient Care Overview  Goal: Plan of Care Review  Pt resting comfortably, continue to assess pain and will continue to monitor

## 2018-03-12 NOTE — ANESTHESIA POSTPROCEDURE EVALUATION
"Patient: Denver G Morris    Procedure Summary     Date:  03/12/18 Room / Location:   [facility] OR  /  [facility]    Anesthesia Start:  0936 Anesthesia Stop:  1151    Procedure:  LEFT LATERAL LUMBAR  INTERBODY FUSION L3-5 (Left Spine Lumbar) Diagnosis:  (M54.12)    Surgeon:  LINDA Larsen MD Provider:  Cecilia Delgadillo CRNA    Anesthesia Type:  general ASA Status:  3          Anesthesia Type: general  Last vitals  BP   150/82 (03/12/18 1414)   Temp   98 °F (36.7 °C) (03/12/18 1414)   Pulse   78 (03/12/18 1414)   Resp   14 (03/12/18 1414)     SpO2   100 % (03/12/18 1414)     Post Anesthesia Care and Evaluation    PONV Status: none  Comments: Patient d/c from PACU prior to anes eval based on Joseph score.  Please see RN notes for details of d/c criteria.    Blood pressure 150/82, pulse 78, temperature 98 °F (36.7 °C), temperature source Oral, resp. rate 14, height 170.2 cm (67\"), weight 81.6 kg (180 lb), SpO2 100 %.          "

## 2018-03-12 NOTE — ANESTHESIA PROCEDURE NOTES
Airway  Urgency: elective    Airway not difficult    General Information and Staff    Patient location during procedure: OR  CRNA: EYE, MIGUEL ÁNGEL    Indications and Patient Condition  Indications for airway management: airway protection    Preoxygenated: yes  MILS maintained throughout  Mask difficulty assessment: 1 - vent by mask    Final Airway Details  Final airway type: endotracheal airway      Successful airway: ETT  Cuffed: yes   Successful intubation technique: direct laryngoscopy  Facilitating devices/methods: intubating stylet  Endotracheal tube insertion site: oral  Blade: Margaret  Blade size: #3  ETT size: 7.5 mm  Cormack-Lehane Classification: grade I - full view of glottis  Placement verified by: chest auscultation and capnometry   Cuff volume (mL): 8  Measured from: teeth  ETT to teeth (cm): 22  Number of attempts at approach: 1

## 2018-03-12 NOTE — PROGRESS NOTES
Discharge Planning Assessment  [facility]     Patient Name: Denver G Morris  MRN: [id number]  Today's Date: 3/12/2018    Admit Date: 3/12/2018          Discharge Needs Assessment     Row Name 03/12/18 1533       Living Environment    Lives With spouse    Current Living Arrangements home/apartment/condo    Primary Care Provided by spouse/significant other;child(renee)   daughter    Family Caregiver if Needed none    Quality of Family Relationships involved;helpful    Able to Return to Prior Arrangements other (see comments)   follow progress with PT/OT       Resource/Environmental Concerns    Resource/Environmental Concerns none    Transportation Concerns car, none       Transition Planning    Patient/Family Anticipates Transition to home    Patient/Family Anticipated Services at Transition none    Transportation Anticipated car, drives self;family or friend will provide       Discharge Needs Assessment    Readmission Within the Last 30 Days no previous admission in last 30 days    Equipment Currently Used at Home walker, rolling;cane, quad    Anticipated Changes Related to Illness inability to care for self    Discharge Coordination/Progress Family and patient desire to return home without inpt rehab. Patient has used SNF in [place] before. Does not want HH if at all possible. Pending PT/OT evals. supportive family.  Wife and daughter at bedside. Had to have inpt rehab after last surgery.  SW to follow for referrals if needed.              Discharge Plan    No documentation.       Destination     No service coordination in this encounter.      Durable Medical Equipment     No service coordination in this encounter.      Dialysis/Infusion     No service coordination in this encounter.      Home Medical Care     No service coordination in this encounter.      Social Care     No service coordination in this encounter.                Demographic Summary    No documentation.           Functional Status    No  documentation.           Psychosocial    No documentation.           Abuse/Neglect    No documentation.           Legal    No documentation.           Substance Abuse    No documentation.           Patient Forms    No documentation.         Diana Jin RN

## 2018-03-12 NOTE — ANESTHESIA PREPROCEDURE EVALUATION
Anesthesia Evaluation     Patient summary reviewed   history of anesthetic complications: prolonged sedation  NPO Solid Status: > 8 hours             Airway   Mallampati: III  TM distance: >3 FB  Neck ROM: full  Dental      Pulmonary    (-) asthma, sleep apnea, not a smoker  Cardiovascular   Exercise tolerance: good (4-7 METS)    ECG reviewed  Patient on routine beta blocker and Beta blocker given within 24 hours of surgery    (+) hypertension, dysrhythmias Atrial Fib,   (-) pacemaker, past MI, angina, cardiac stents      Neuro/Psych  (-) seizures, TIA, CVA  GI/Hepatic/Renal/Endo    (-) GERD, liver disease, no renal disease, diabetes    Musculoskeletal     Abdominal    Substance History      OB/GYN          Other                        Anesthesia Plan    ASA 3     general     intravenous induction   Anesthetic plan and risks discussed with patient.

## 2018-03-13 LAB
ANION GAP SERPL CALCULATED.3IONS-SCNC: 8 MMOL/L (ref 4–13)
BASOPHILS # BLD AUTO: 0.04 10*3/MM3 (ref 0–0.2)
BASOPHILS NFR BLD AUTO: 0.4 % (ref 0–2)
BUN BLD-MCNC: 8 MG/DL (ref 5–21)
BUN/CREAT SERPL: 11.3 (ref 7–25)
CALCIUM SPEC-SCNC: 8.4 MG/DL (ref 8.4–10.4)
CHLORIDE SERPL-SCNC: 96 MMOL/L (ref 98–110)
CO2 SERPL-SCNC: 31 MMOL/L (ref 24–31)
CREAT BLD-MCNC: 0.71 MG/DL (ref 0.5–1.4)
DEPRECATED RDW RBC AUTO: 47.1 FL (ref 40–54)
EOSINOPHIL # BLD AUTO: 0 10*3/MM3 (ref 0–0.7)
EOSINOPHIL NFR BLD AUTO: 0 % (ref 0–4)
ERYTHROCYTE [DISTWIDTH] IN BLOOD BY AUTOMATED COUNT: 13.4 % (ref 12–15)
GFR SERPL CREATININE-BSD FRML MDRD: 107 ML/MIN/1.73
GLUCOSE BLD-MCNC: 132 MG/DL (ref 70–100)
HCT VFR BLD AUTO: 39.9 % (ref 40–52)
HGB BLD-MCNC: 13.5 G/DL (ref 14–18)
IMM GRANULOCYTES # BLD: 0.08 10*3/MM3 (ref 0–0.03)
IMM GRANULOCYTES NFR BLD: 0.7 % (ref 0–5)
LYMPHOCYTES # BLD AUTO: 0.7 10*3/MM3 (ref 0.72–4.86)
LYMPHOCYTES NFR BLD AUTO: 6.2 % (ref 15–45)
MCH RBC QN AUTO: 32.3 PG (ref 28–32)
MCHC RBC AUTO-ENTMCNC: 33.8 G/DL (ref 33–36)
MCV RBC AUTO: 95.5 FL (ref 82–95)
MONOCYTES # BLD AUTO: 0.85 10*3/MM3 (ref 0.19–1.3)
MONOCYTES NFR BLD AUTO: 7.5 % (ref 4–12)
NEUTROPHILS # BLD AUTO: 9.61 10*3/MM3 (ref 1.87–8.4)
NEUTROPHILS NFR BLD AUTO: 85.2 % (ref 39–78)
NRBC BLD MANUAL-RTO: 0 /100 WBC (ref 0–0)
PLATELET # BLD AUTO: 174 10*3/MM3 (ref 130–400)
PMV BLD AUTO: 9.2 FL (ref 6–12)
POTASSIUM BLD-SCNC: 4.2 MMOL/L (ref 3.5–5.3)
RBC # BLD AUTO: 4.18 10*6/MM3 (ref 4.8–5.9)
SODIUM BLD-SCNC: 135 MMOL/L (ref 135–145)
WBC NRBC COR # BLD: 11.28 10*3/MM3 (ref 4.8–10.8)

## 2018-03-13 PROCEDURE — G8987 SELF CARE CURRENT STATUS: HCPCS | Performed by: OCCUPATIONAL THERAPIST

## 2018-03-13 PROCEDURE — 85025 COMPLETE CBC W/AUTO DIFF WBC: CPT | Performed by: ORTHOPAEDIC SURGERY

## 2018-03-13 PROCEDURE — 25010000002 HYDROMORPHONE PER 4 MG: Performed by: ORTHOPAEDIC SURGERY

## 2018-03-13 PROCEDURE — 97530 THERAPEUTIC ACTIVITIES: CPT

## 2018-03-13 PROCEDURE — G8979 MOBILITY GOAL STATUS: HCPCS

## 2018-03-13 PROCEDURE — G8988 SELF CARE GOAL STATUS: HCPCS | Performed by: OCCUPATIONAL THERAPIST

## 2018-03-13 PROCEDURE — 25010000003 CEFAZOLIN PER 500 MG: Performed by: ORTHOPAEDIC SURGERY

## 2018-03-13 PROCEDURE — 97165 OT EVAL LOW COMPLEX 30 MIN: CPT | Performed by: OCCUPATIONAL THERAPIST

## 2018-03-13 PROCEDURE — 97161 PT EVAL LOW COMPLEX 20 MIN: CPT

## 2018-03-13 PROCEDURE — 97116 GAIT TRAINING THERAPY: CPT

## 2018-03-13 PROCEDURE — G8978 MOBILITY CURRENT STATUS: HCPCS

## 2018-03-13 PROCEDURE — 80048 BASIC METABOLIC PNL TOTAL CA: CPT | Performed by: ORTHOPAEDIC SURGERY

## 2018-03-13 RX ORDER — OXYCODONE AND ACETAMINOPHEN 10; 325 MG/1; MG/1
2 TABLET ORAL EVERY 4 HOURS PRN
Start: 2018-03-13 | End: 2018-03-22

## 2018-03-13 RX ADMIN — FAMOTIDINE 20 MG: 20 TABLET, FILM COATED ORAL at 08:46

## 2018-03-13 RX ADMIN — CYCLOBENZAPRINE HYDROCHLORIDE 10 MG: 10 TABLET, FILM COATED ORAL at 08:46

## 2018-03-13 RX ADMIN — LOSARTAN POTASSIUM 50 MG: 50 TABLET ORAL at 08:46

## 2018-03-13 RX ADMIN — DIGOXIN 125 MCG: 0.12 TABLET ORAL at 12:00

## 2018-03-13 RX ADMIN — CEFAZOLIN SODIUM 1 G: 1 INJECTION, POWDER, FOR SOLUTION INTRAMUSCULAR; INTRAVENOUS at 09:30

## 2018-03-13 RX ADMIN — FUROSEMIDE 20 MG: 20 TABLET ORAL at 18:34

## 2018-03-13 RX ADMIN — FUROSEMIDE 20 MG: 20 TABLET ORAL at 08:46

## 2018-03-13 RX ADMIN — OXYCODONE HYDROCHLORIDE AND ACETAMINOPHEN 2 TABLET: 10; 325 TABLET ORAL at 12:43

## 2018-03-13 RX ADMIN — CEFAZOLIN SODIUM 1 G: 1 INJECTION, POWDER, FOR SOLUTION INTRAMUSCULAR; INTRAVENOUS at 01:07

## 2018-03-13 RX ADMIN — FAMOTIDINE 20 MG: 20 TABLET, FILM COATED ORAL at 20:56

## 2018-03-13 RX ADMIN — HYDROMORPHONE HYDROCHLORIDE 1 MG: 1 INJECTION, SOLUTION INTRAMUSCULAR; INTRAVENOUS; SUBCUTANEOUS at 01:10

## 2018-03-13 RX ADMIN — METOPROLOL TARTRATE 25 MG: 25 TABLET, FILM COATED ORAL at 08:46

## 2018-03-13 RX ADMIN — METOPROLOL TARTRATE 25 MG: 25 TABLET, FILM COATED ORAL at 20:56

## 2018-03-13 NOTE — DISCHARGE SUMMARY
Date of Discharge:  3/13/2018    Admission Diagnosis: M54.12    Discharge Diagnosis:      1.  Increasing chronic back pain  2.  Bilateral buttock, thigh, and leg radiculopathy  3.  Severe neurogenic claudication  4.  Multilevel lumbar degenerative disc disease, L2 to S1, worse L3-4  5.  Loss of lumbar lordosis  6.  Degenerative lumbar scoliosis  7.  Multilevel lumbar facet arthropathy  8.  Retrolisthesis L3-4  9.  Severe foraminal stenosis, worse L3 to 5  10. Status post Left Lateral Lumbar Interbody Fusion L3-4, L4-5 - 2/12/18    Consults During Admission: none    Hospital Course  Patient is a 82 y.o. male Known to our practice. Admitted for the above lumbar fusion.  This has been well tolerated and the patient will be discharged home today in good stable condition with instructions for brace when out of bed.  No driving until directed.  Patient will follow-up with Dr. Larsen's clinic in two weeks. They will call if problems arise.       Condition on Discharge:  STABLE    Vital Signs  Temp:  [97 °F (36.1 °C)-99 °F (37.2 °C)] 98 °F (36.7 °C)  Heart Rate:  [] 73  Resp:  [14-19] 19  BP: (101-175)/(55-93) 127/55    Physical Exam:   Alert and oriented ×3, no acute distress, grossly neurovascularly intact, vital signs stable, dressing clean dry and intact, moving all extremities without focal deficit    Discharge Disposition  Home or Self Care    Discharge Medications   Morris, Denver G   Home Medication Instructions SHA:[id number]    Printed on:03/13/18 0891   Medication Information                      aspirin 81 MG chewable tablet  Chew 81 mg Daily.             cyclobenzaprine (FLEXERIL) 10 MG tablet  Take 10 mg by mouth 3 (Three) Times a Day As Needed for Muscle Spasms.             digoxin (LANOXIN) 125 MCG tablet  Take 125 mcg by mouth every night at bedtime.             furosemide (LASIX) 20 MG tablet  Take 20 mg by mouth 2 (Two) Times a Day.             losartan (COZAAR) 50 MG tablet  Take 50 mg by  mouth Daily.             metoprolol tartrate (LOPRESSOR) 25 MG tablet  Take 25 mg by mouth 2 (Two) Times a Day.             oxyCODONE-acetaminophen (PERCOCET)  MG per tablet  Take 2 tablets by mouth Every 4 (Four) Hours As Needed for Severe Pain  for up to 9 days.                 Discharge Diet: Resume Home diet, advance as tolerated    Activity at Discharge: Resume home activity advace as tolerated, no lifting, no twisting, no bending, brace as directed, no driving until directed.     Follow-up Appointments  Followup with PCP within one week  Followup [facility] at 2weeks post-op    Addendum:   D/c held yesterday secondary to some perceived weakness when walking. Patient will be discharged this afternoon as above.  If he wishes, we can consider  or an outlying rehab facility         KIRBY Pavon  03/13/18  8:11 AM

## 2018-03-13 NOTE — PLAN OF CARE
Problem: Patient Care Overview  Goal: Plan of Care Review  Outcome: Ongoing (interventions implemented as appropriate)   03/13/18 0305   Coping/Psychosocial   Plan of Care Reviewed With patient   Plan of Care Review   Progress no change   OTHER   Outcome Summary PT CO pain 6-8, prn Percocet and Dilaudid tolerated well. DRSg CDI. VSS. No NV changes.        Problem: Laminectomy/Foraminotomy/Discectomy (Adult)  Goal: Signs and Symptoms of Listed Potential Problems Will be Absent, Minimized or Managed (Laminectomy/Foraminotomy/Discectomy)  Outcome: Ongoing (interventions implemented as appropriate)    Goal: Anesthesia/Sedation Recovery  Outcome: Outcome(s) achieved Date Met: 03/12/18

## 2018-03-13 NOTE — THERAPY TREATMENT NOTE
Acute Care - Physical Therapy Treatment Note  [facility]     Patient Name: Denver G Morris  : 1935  MRN: [id number]  Today's Date: 3/13/2018  Onset of Illness/Injury or Date of Surgery: 18  Date of Referral to PT: 18  Referring Physician: Dr. Larsen    Admit Date: 3/12/2018    Visit Dx:    ICD-10-CM ICD-9-CM   1. Impaired gait and mobility R26.89 781.2   2. Impaired mobility and ADLs Z74.09 799.89     Patient Active Problem List   Diagnosis   • Lumbar stenosis       Therapy Treatment    Therapy Treatment / Health Promotion    Treatment Time/Intention  Discipline: physical therapy assistant  Document Type: therapy note (daily note)  Subjective Information: complains of, weakness, fatigue, pain (pt very drowsy)  Mode of Treatment: physical therapy  Therapy Frequency (PT Clinical Impression): 2 times/day  Patient Effort: good  Existing Precautions/Restrictions: brace worn when out of bed, fall, oxygen therapy device and L/min, spinal  Plan of Care Review  Plan of Care Reviewed With: patient    Vitals/Pain/Safety  Pain Assessment  Additional Documentation: Pain Scale: FACES Pre/Post-Treatment (Group)  Pain Scale: Numbers Pre/Post-Treatment  Pain Scale: Numbers, Pretreatment: 5/10  Pain Location - Orientation: lower  Pain Location: back  Pre/Post Treatment Pain Comment: tolerated  Pain Intervention(s): Repositioned  Pain Scale: Word Pre/Post-Treatment  Pain Location - Orientation: lower  Pain Location: back  Pain Intervention(s): Repositioned  Pain Scale: FACES Pre/Post-Treatment  Pain: FACES Scale, Pretreatment: 6-->hurts even more  Pain Location - Orientation: lower  Pain Location: back  Pain Intervention(s): Repositioned  Safety Issues, Functional Mobility  Safety Issues Affecting Function (Mobility): insight into deficits/self awareness  Impairments Affecting Function (Mobility): balance, pain  Positioning and Restraints  Pre-Treatment Position: in bed  Post Treatment Position: bed  In Bed:  supine, call light within reach, encouraged to call for assist, exit alarm on, with family/caregiver, SCD pump applied  In Chair: sitting, call light within reach, encouraged to call for assist    Mobility,ADL,Motor, Modality  Bed Mobility Assessment/Treatment  Bed Mobility Assessment/Treatment: bed mobility (all) activities, sidelying-sit-sidelying  Sidelying-Sit-Sidelying [place] (Bed Mobility): minimum assist (75% patient effort), moderate assist (50% patient effort), 2 person assist, verbal cues  Comment (Bed Mobility): pt very drowsy  Transfer Assessment/Treatment  Transfer Assessment/Treatment: sit-stand transfer, stand-sit transfer  Sit-Stand Transfer  Sit-Stand [place] (Transfers): minimum assist (75% patient effort), moderate assist (50% patient effort), 2 person assist, verbal cues  Stand-Sit Transfer  Stand-Sit [place] (Transfers): minimum assist (75% patient effort), 2 person assist, verbal cues  Gait/Stairs Assessment/Training  [place] Level (Gait): minimum assist (75% patient effort), 2 person assist, verbal cues  Assistive Device (Gait):  (HHA)  Distance in Feet (Gait): 50  Pattern (Gait): step-through  Deviations/Abnormal Patterns (Gait): yoshi decreased, stride length decreased                 ROM/MMT  General ROM  GENERAL ROM COMMENTS: BLE AROM WFL  General Assessment (Manual Muscle Testing)  Comment, General Manual Muscle Testing (MMT) Assessment: functionally L knee 4/5 L hip 3-/5, R knee 4-/5, R hip 3+/5       Sensory, Edema, Orthotics     Orthotic/Prosthetic Management  Orthosis Location: spinal orthosis  Spinal Orthosis Management  Type (Spinal Orthosis): LSO (lumbar sacral orthosis)  Therapeutic Indications (Spinal Orthosis): post-op positioning/protection, rest/inflammation reduction, stabilization and support  Wearing Schedule (Spinal Orthosis): wear when out of bed only  Orthosis Training (Spinal Orthosis): patient, activity limitations/precautions, donning/doffing  orthosis, orthosis adjustment, orthosis maintenance, purpose/goals of orthosis, wearing schedule  Compliance/Wearing Issues (Spinal Orthosis): patient/caregiver comprehend strategies, patient/caregiver comprehend rationale for orthosis    Cognition, Communication, Swallow  Cognitive Assessment/Intervention- PT/OT  Orientation Status (Cognition): oriented x 4  Follows Commands (Cognition): WNL    Outcome Summary  Outcome Summary/Treatment Plan (PT)  Anticipated Discharge Disposition (PT): home with home health care            PT Rehab Goals     Row Name 03/13/18 1039             Bed Mobility Goal 1 (PT)    Activity/Assistive Device (Bed Mobility Goal 1, PT) bed mobility activities, all  -PB      [place] Level/Cues Needed (Bed Mobility Goal 1, PT) independent  -PB      Time Frame (Bed Mobility Goal 1, PT) long term goal (LTG);by discharge  -PB      Barriers (Bed Mobility Goal 1, PT) spinal precautions  -PB      Progress/Outcomes (Bed Mobility Goal 1, PT) goal ongoing  -PB         Transfer Goal 1 (PT)    Activity/Assistive Device (Transfer Goal 1, PT) sit-to-stand/stand-to-sit;bed-to-chair/chair-to-bed  -PB      [place] Level/Cues Needed (Transfer Goal 1, PT) independent  -PB      Time Frame (Transfer Goal 1, PT) long term goal (LTG);by discharge  -PB      Barriers (Transfers Goal 1, PT) spinal precautions  -PB      Progress/Outcome (Transfer Goal 1, PT) goal ongoing  -PB         Gait Training Goal 1 (PT)    Activity/Assistive Device (Gait Training Goal 1, PT) gait (walking locomotion);cane, quad  -PB      [place] Level (Gait Training Goal 1, PT) conditional independence  -PB      Distance (Gait Goal 1, PT) 250  -PB      Time Frame (Gait Training Goal 1, PT) long term goal (LTG);by discharge  -PB      Barriers (Gait Training Goal 1, PT) spinal precautions, LE weakness  -PB      Progress/Outcome (Gait Training Goal 1, PT) goal ongoing  -PB         Stairs Goal 1 (PT)    Activity/Assistive Device (Stairs  Goal 1, PT) ascending stairs;descending stairs;cane, quad   and handrail  -PB      [place] Level/Cues Needed (Stairs Goal 1, PT) contact guard assist  -PB      Number of Stairs (Stairs Goal 1, PT) 5  -PB      Time Frame (Stairs Goal 1, PT) long term goal (LTG);by discharge  -PB      Barriers (Stairs Goal 1, PT) spinal precautions and LE weakness  -PB      Progress/Outcome (Stairs Goal 1, PT) goal ongoing  -PB         Patient Education Goal (PT)    Activity (Patient Education Goal, PT) demonstrate irma/doff LSO, verbalize spinal precautions  -PB      [place]/Cues/Accuracy (Memory Goal 2, PT) demonstrates adequately  -PB      Time Frame (Patient Education Goal, PT) long term goal (LTG);by discharge  -PB      Barriers (Patient Education Goal, PT) none  -PB      Progress/Outcome (Patient Education Goal, PT) goal ongoing  -PB        User Key  (r) = Recorded By, (t) = Taken By, (c) = Cosigned By    Initials Name Provider Type    PB Milad Archibald, PT DPT Physical Therapist          Physical Therapy Education     Title: PT OT SLP Therapies (Active)     Topic: Physical Therapy (Active)     Point: Mobility training (Done)    Learning Progress Summary     Learner Status Readiness Method Response Comment Documented by    Patient Done Acceptance E VU,NR LSO, precautions, transfers, benefits of activity PB 03/13/18 1124          Point: Body mechanics (Done)    Learning Progress Summary     Learner Status Readiness Method Response Comment Documented by    Patient Done Acceptance E VU,NR LSO, precautions, transfers, benefits of activity PB 03/13/18 1124          Point: Precautions (Done)    Learning Progress Summary     Learner Status Readiness Method Response Comment Documented by    Patient Done Acceptance E VU,NR LSO, precautions, transfers, benefits of activity PB 03/13/18 1124                      User Key     Initials Effective Dates Name Provider Type Discipline    PB 08/02/16 -  Milad Archibald, PT DPT  Physical Therapist PT                    PT Recommendation and Plan  Anticipated Discharge Disposition (PT): home with home health care  Planned Therapy Interventions (PT Eval): balance training, bed mobility training, gait training, home exercise program, orthotic fitting/training, patient/family education, strengthening, stair training, transfer training  Therapy Frequency (PT Clinical Impression): 2 times/day  Plan of Care Reviewed With: patient  Progress: declining  Outcome Summary: pt very drowsy, difficult to keep awake,pt disoriented thought he was in [place]. mod assist to trans to EOB, sit-stand min-mod of 2, pt amb 40 feet min of 2 HHA. Pt required max assist to irma/doff LSO.pt would benefit from cont therapy          Outcome Measures     Row Name 03/13/18 1300 03/13/18 1039          How much help from another person do you currently need...    Turning from your back to your side while in flat bed without using bedrails?  -- 3  -PB     Moving from lying on back to sitting on the side of a flat bed without bedrails?  -- 3  -PB     Moving to and from a bed to a chair (including a wheelchair)?  -- 3  -PB     Standing up from a chair using your arms (e.g., wheelchair, bedside chair)?  -- 3  -PB     Climbing 3-5 steps with a railing?  -- 3  -PB     To walk in hospital room?  -- 3  -PB     AM-PAC 6 Clicks Score  -- 18  -PB        How much help from another is currently needed...    Putting on and taking off regular lower body clothing? 2  -MM  --     Bathing (including washing, rinsing, and drying) 2  -MM  --     Toileting (which includes using toilet bed pan or urinal) 2  -MM  --     Putting on and taking off regular upper body clothing 2  -MM  --     Taking care of personal grooming (such as brushing teeth) 3  -MM  --     Eating meals 3  -MM  --     Score 14  -MM  --        Functional Assessment    Outcome Measure Options AM-PAC 6 Clicks Daily Activity (OT)  -MM AM-PAC 6 Clicks Basic Mobility (PT)  -PB        User Key  (r) = Recorded By, (t) = Taken By, (c) = Cosigned By    Initials Name Provider Type    PB Milad Archibald, PT DPT Physical Therapist    MM Hector Vera, OTR/L Occupational Therapist           Time Calculation:         PT Charges     Row Name 03/13/18 1531 03/13/18 1125          Time Calculation    Start Time 1425  -AH 1039  -PB     Stop Time 1456  -AH 1106  -PB     Time Calculation (min) 31 min  - 27 min  -PB     PT Received On  -- 03/13/18  -PB     PT Goal Re-Cert Due Date  -- 03/23/18  -PB        Time Calculation- PT    Total Timed Code Minutes- PT 31 minute(s)  -  --       User Key  (r) = Recorded By, (t) = Taken By, (c) = Cosigned By    Initials Name Provider Type     Clarissa Lopez, PTA Physical Therapy Assistant    PB Milad Archibald, PT DPT Physical Therapist          Therapy Charges for Today     Code Description Service Date Service Provider Modifiers Qty    54725208044 HC GAIT TRAINING EA 15 MIN 3/13/2018 Clarissa Lopez PTA  1    [id number] HC PT THERAPEUTIC ACT EA 15 MIN 3/13/2018 Clarissa Lopez, PTA  1          PT G-Codes  Outcome Measure Options: AM-PAC 6 Clicks Daily Activity (OT)  Score: 18  Functional Limitation: Mobility: Walking and moving around  Mobility: Walking and Moving Around Current Status (): At least 40 percent but less than 60 percent impaired, limited or restricted  Mobility: Walking and Moving Around Goal Status (): At least 1 percent but less than 20 percent impaired, limited or restricted    Clarissa Lopez PTA  3/13/2018

## 2018-03-13 NOTE — THERAPY EVALUATION
Acute Care - Physical Therapy Initial Evaluation  [facility]     Patient Name: Denver G Morris  : 1935  MRN: [id number]  Today's Date: 3/13/2018   Onset of Illness/Injury or Date of Surgery: 18  Date of Referral to PT: 18  Referring Physician: Dr Larsen      Admit Date: 3/12/2018    Visit Dx:     ICD-10-CM ICD-9-CM   1. Impaired gait and mobility R26.89 781.2     Patient Active Problem List   Diagnosis   • Lumbar stenosis     Past Medical History:   Diagnosis Date   • Arthritis    • Atrial fibrillation     HX OF SINCE    • Hiatal hernia    • Hypertension      Past Surgical History:   Procedure Laterality Date   • APPENDECTOMY     • CERVICAL FUSION     • CHOLECYSTECTOMY     • COLONOSCOPY W/ BIOPSIES AND POLYPECTOMY     • HERNIA REPAIR      X 2   • JOINT REPLACEMENT Right    • LUMBAR FUSION Left 3/12/2018    Procedure: LEFT LATERAL LUMBAR  INTERBODY FUSION L3-5;  Surgeon: LINDA Larsen MD;  Location: [facility];  Service: Orthopedic Spine        PT ASSESSMENT (last 72 hours)      Physical Therapy Evaluation     Row Name 18 1039          PT Evaluation Time/Intention    Subjective Information complains of;pain;weakness;fatigue;numbness   chronic numbness B feet  -PB     Document Type evaluation   see MAR  -PB     Mode of Treatment physical therapy  -PB     Patient Effort good  -PB     Symptoms Noted During/After Treatment fatigue  -PB     Row Name 18 1039          General Information    Patient Profile Reviewed? yes  -PB     Onset of Illness/Injury or Date of Surgery 18  -PB     Referring Physician Dr Larsen  -PB     Patient Observations alert;cooperative  -PB     General Observations of Patient awake and alert sitting EOB wearing LSO  -PB     Prior Level of Function independent:;all household mobility;community mobility;ADL's   SC outside, furniture in house  -PB     Equipment Currently Used at Home walker, rolling;shower chair;cane, quad  -PB     Existing  Precautions/Restrictions fall;brace worn when out of bed;spinal  -PB     Risks Reviewed patient:;LOB;nausea/vomiting;dizziness;increased discomfort  -PB     Benefits Reviewed patient:;improve function;increase independence;increase strength;increase balance  -PB     Barriers to Rehab medically complex;physical barrier  -PB     Row Name 03/13/18 1039          Relationship/Environment    Lives With spouse  -PB     Row Name 03/13/18 1039          Resource/Environmental Concerns    Current Living Arrangements home/apartment/condo  -PB     Row Name 03/13/18 1039          Home Main Entrance    Number of Stairs, Main Entrance five  -PB     Stair Railings, Main Entrance railings on both sides of stairs  -PB     Row Name 03/13/18 1039          Cognitive Assessment/Intervention- PT/OT    Orientation Status (Cognition) oriented x 4  -PB     Follows Commands (Cognition) WNL  -PB     Row Name 03/13/18 1039          Safety Issues, Functional Mobility    Safety Issues Affecting Function (Mobility) insight into deficits/self awareness  -PB     Impairments Affecting Function (Mobility) balance;pain  -PB     Row Name 03/13/18 1039          Bed Mobility Assessment/Treatment    Comment (Bed Mobility) pt sitting EOB   -PB     Row Name 03/13/18 1039          Transfer Assessment/Treatment    Transfer Assessment/Treatment sit-stand transfer;stand-sit transfer  -PB     Sit-Stand [place] (Transfers) stand by assist  -PB     Stand-Sit [place] (Transfers) contact guard;verbal cues  -PB     Row Name 03/13/18 1039          Gait/Stairs Assessment/Training    [place] Level (Gait) minimum assist (75% patient effort);verbal cues  -PB     Assistive Device (Gait) other (see comments)   HHA on L  -PB     Distance in Feet (Gait) 100  -PB     Pattern (Gait) step-through  -PB     Deviations/Abnormal Patterns (Gait) bilateral deviations;stride length decreased   knees buckling  -PB     Row Name 03/13/18 1039          General ROM    GENERAL  ROM COMMENTS BLE AROM WFL  -PB     Row Name 03/13/18 1039          General Assessment (Manual Muscle Testing)    Comment, General Manual Muscle Testing (MMT) Assessment functionally L knee 4/5 L hip 3-/5, R knee 4-/5, R hip 3+/5  -PB     Row Name 03/13/18 1039          Pain Assessment    Additional Documentation Pain Scale: Numbers Pre/Post-Treatment (Group)  -PB     Row Name 03/13/18 1039          Pain Scale: Numbers Pre/Post-Treatment    Pain Scale: Numbers, Pretreatment 5/10  -PB     Pain Location - Orientation lower  -PB     Pain Location back  -PB     Pre/Post Treatment Pain Comment tolerated  -PB     Pain Intervention(s) Repositioned  -PB     Row Name 03/13/18 1039          Orthotics & Prosthetics Management    Orthosis Location spinal orthosis  -PB     Additional Documentation Orthosis Location (Row)  -PB     Row Name 03/13/18 1039          Spinal Orthosis Management    Type (Spinal Orthosis) LSO (lumbar sacral orthosis)  -PB     Therapeutic Indications (Spinal Orthosis) post-op positioning/protection;rest/inflammation reduction;stabilization and support  -PB     Wearing Schedule (Spinal Orthosis) wear when out of bed only  -PB     Orthosis Training (Spinal Orthosis) patient;activity limitations/precautions;donning/doffing orthosis;orthosis adjustment;orthosis maintenance;purpose/goals of orthosis;wearing schedule  -PB     Compliance/Wearing Issues (Spinal Orthosis) patient/caregiver comprehend strategies;patient/caregiver comprehend rationale for orthosis  -PB     Row Name             Wound 03/12/18 1125 Left lateral flank surgical    Wound - Properties Group Date first assessed: 03/12/18  -BC Time first assessed: 1125  -BC Present On Admission : yes  -BC Side: Left  -BC Orientation: lateral  -BC Location: flank  -BC Type: surgical  -BC Stage, Pressure Injury: other (see comments)  -BC, surgical site  Additional Comments: mastisol, steri, mepilex placed as dressing  -BC    Row Name 03/13/18 1030           Physical Therapy Clinical Impression    Date of Referral to PT 03/12/18  -PB     PT Diagnosis (PT Clinical Impression) impaired gait and mobility  -PB     Patient/Family Goals Statement (PT Clinical Impression) return home  -PB     Criteria for Skilled Interventions Met (PT Clinical Impression) yes;treatment indicated  -PB     Impairments Found (describe specific impairments) gait, locomotion, and balance;aerobic capacity/endurance  -PB     Rehab Potential (PT Clinical Summary) good, to achieve stated therapy goals  -PB     Predicted Duration of Therapy (PT) until D/C  -PB     Care Plan Review (PT) evaluation/treatment results reviewed;care plan/treatment goals reviewed;risks/benefits reviewed;current/potential barriers reviewed;patient/other agree to care plan  -PB     Row Name 03/13/18 1039          Physical Therapy Goals    Bed Mobility Goal Selection (PT) bed mobility, PT goal 1  -PB     Transfer Goal Selection (PT) transfer, PT goal 1  -PB     Gait Training Goal Selection (PT) gait training, PT goal 1  -PB     Stairs Goal Selection (PT) stairs, PT goal 1  -PB     Additional Documentation Stairs Goal Selection (PT) (Row)  -PB     Row Name 03/13/18 1039          Bed Mobility Goal 1 (PT)    Activity/Assistive Device (Bed Mobility Goal 1, PT) bed mobility activities, all  -PB     [place] Level/Cues Needed (Bed Mobility Goal 1, PT) independent  -PB     Time Frame (Bed Mobility Goal 1, PT) long term goal (LTG);by discharge  -PB     Barriers (Bed Mobility Goal 1, PT) spinal precautions  -PB     Progress/Outcomes (Bed Mobility Goal 1, PT) goal ongoing  -PB     Row Name 03/13/18 1039          Transfer Goal 1 (PT)    Activity/Assistive Device (Transfer Goal 1, PT) sit-to-stand/stand-to-sit;bed-to-chair/chair-to-bed  -PB     [place] Level/Cues Needed (Transfer Goal 1, PT) independent  -PB     Time Frame (Transfer Goal 1, PT) long term goal (LTG);by discharge  -PB     Barriers (Transfers Goal 1, PT) spinal  precautions  -PB     Progress/Outcome (Transfer Goal 1, PT) goal ongoing  -PB     Row Name 03/13/18 1039          Gait Training Goal 1 (PT)    Activity/Assistive Device (Gait Training Goal 1, PT) gait (walking locomotion);cane, quad  -PB     [place] Level (Gait Training Goal 1, PT) conditional independence  -PB     Distance (Gait Goal 1, PT) 250  -PB     Time Frame (Gait Training Goal 1, PT) long term goal (LTG);by discharge  -PB     Barriers (Gait Training Goal 1, PT) spinal precautions, LE weakness  -PB     Progress/Outcome (Gait Training Goal 1, PT) goal ongoing  -PB     Row Name 03/13/18 1039          Stairs Goal 1 (PT)    Activity/Assistive Device (Stairs Goal 1, PT) ascending stairs;descending stairs;cane, quad   and handrail  -PB     [place] Level/Cues Needed (Stairs Goal 1, PT) contact guard assist  -PB     Number of Stairs (Stairs Goal 1, PT) 5  -PB     Time Frame (Stairs Goal 1, PT) long term goal (LTG);by discharge  -PB     Barriers (Stairs Goal 1, PT) spinal precautions and LE weakness  -PB     Progress/Outcome (Stairs Goal 1, PT) goal ongoing  -PB     Row Name 03/13/18 1039          Patient Education Goal (PT)    Activity (Patient Education Goal, PT) demonstrate irma/doff LSO, verbalize spinal precautions  -PB     [place]/Cues/Accuracy (Memory Goal 2, PT) demonstrates adequately  -PB     Time Frame (Patient Education Goal, PT) long term goal (LTG);by discharge  -PB     Barriers (Patient Education Goal, PT) none  -PB     Progress/Outcome (Patient Education Goal, PT) goal ongoing  -PB     Row Name 03/13/18 1039          Positioning and Restraints    Pre-Treatment Position in bed  -PB     Post Treatment Position chair  -PB     In Chair sitting;call light within reach;encouraged to call for assist  -PB     Row Name 03/13/18 1039          Living Environment    Home Accessibility stairs to enter home   walk in shower  -PB       User Key  (r) = Recorded By, (t) = Taken By, (c) = Cosigned By     Initials Name Provider Type    BC Paul Vuong, RN Registered Nurse    PB Milad Archibald, PT DPT Physical Therapist          Physical Therapy Education     Title: PT OT SLP Therapies (Active)     Topic: Physical Therapy (Active)     Point: Mobility training (Done)    Learning Progress Summary     Learner Status Readiness Method Response Comment Documented by    Patient Done Acceptance E VU,NR LSO, precautions, transfers, benefits of activity PB 03/13/18 1124          Point: Body mechanics (Done)    Learning Progress Summary     Learner Status Readiness Method Response Comment Documented by    Patient Done Acceptance E VU,NR LSO, precautions, transfers, benefits of activity PB 03/13/18 1124          Point: Precautions (Done)    Learning Progress Summary     Learner Status Readiness Method Response Comment Documented by    Patient Done Acceptance E VU,NR LSO, precautions, transfers, benefits of activity PB 03/13/18 1124                      User Key     Initials Effective Dates Name Provider Type Discipline     08/02/16 -  Milad Archibald, PT DPT Physical Therapist PT                PT Recommendation and Plan  Planned Therapy Interventions (PT Eval): balance training, bed mobility training, gait training, home exercise program, orthotic fitting/training, patient/family education, strengthening, stair training, transfer training  Therapy Frequency (PT Clinical Impression): 2 times/day  Plan of Care Reviewed With: patient  Outcome Summary: PT eval complete. pt able to stand CGA with cues, ambulate 100 feet [place] with HHA on L. pt's knees began buckling during gait. pt able to sit CGA with cues for hand placement. pt would benefit from continued skilled PT prior to D/C to address weakness, impaired functional mobility, decreased activity tolerance, and education about bracing and precautions. Anticipate pt will be able to D/C home with HH and family.   Plan of Care Reviewed With: patient          Outcome Measures      Row Name 03/13/18 1039             How much help from another person do you currently need...    Turning from your back to your side while in flat bed without using bedrails? 3  -PB      Moving from lying on back to sitting on the side of a flat bed without bedrails? 3  -PB      Moving to and from a bed to a chair (including a wheelchair)? 3  -PB      Standing up from a chair using your arms (e.g., wheelchair, bedside chair)? 3  -PB      Climbing 3-5 steps with a railing? 3  -PB      To walk in hospital room? 3  -PB      AM-PAC 6 Clicks Score 18  -PB         Functional Assessment    Outcome Measure Options AM-PAC 6 Clicks Basic Mobility (PT)  -PB        User Key  (r) = Recorded By, (t) = Taken By, (c) = Cosigned By    Initials Name Provider Type    PB Milad Archibald, PT DPT Physical Therapist           Time Calculation:         PT Charges     Row Name 03/13/18 1125             Time Calculation    Start Time 1039  -PB      Stop Time 1106  -PB      Time Calculation (min) 27 min  -PB      PT Received On 03/13/18  -PB      PT Goal Re-Cert Due Date 03/23/18  -PB        User Key  (r) = Recorded By, (t) = Taken By, (c) = Cosigned By    Initials Name Provider Type    LEONARD Archibald, PT DPT Physical Therapist          Therapy Charges for Today     Code Description Service Date Service Provider Modifiers Qty    33472115544 HC PT MOBILITY CURRENT 3/13/2018 Milad Archibald PT DPT GP, CK 1    [id number] HC PT MOBILITY PROJECTED 3/13/2018 Milad Archibald, PT DPT GP, CI 1    [id number] HC PT EVAL LOW COMPLEXITY 2 3/13/2018 Milad Archibald, PT DPT GP 1          PT G-Codes  Outcome Measure Options: AM-PAC 6 Clicks Basic Mobility (PT)  Score: 18  Functional Limitation: Mobility: Walking and moving around  Mobility: Walking and Moving Around Current Status (): At least 40 percent but less than 60 percent impaired, limited or restricted  Mobility: Walking and Moving Around Goal Status (): At least 1 percent  but less than 20 percent impaired, limited or restricted      Milad Archibald, PT DPT  3/13/2018

## 2018-03-13 NOTE — PLAN OF CARE
Problem: Patient Care Overview  Goal: Plan of Care Review  Outcome: Ongoing (interventions implemented as appropriate)   03/13/18 9241   Coping/Psychosocial   Plan of Care Reviewed With patient   Plan of Care Review   Progress no change   OTHER   Outcome Summary OT eval completed. Pt was CGA with cues, min A with HHA for functional mobiltiy. Pt's BLE became weaker with mobility and begun buckling. Pt was mod to irma/doff LSO. pt was set up assist for toileting via urinal. Skilled OT recommended to address adls, functional mobility and education. Recommended d/c home with HH.

## 2018-03-13 NOTE — PLAN OF CARE
Problem: Patient Care Overview  Goal: Plan of Care Review  Outcome: Ongoing (interventions implemented as appropriate)   03/13/18 1122   Coping/Psychosocial   Plan of Care Reviewed With patient   OTHER   Outcome Summary PT eval complete. pt able to stand CGA with cues, ambulate 100 feet Beth with HHA on L. pt's knees began buckling during gait. pt able to sit CGA with cues for hand placement. pt would benefit from continued skilled PT prior to D/C to address weakness, impaired functional mobility, decreased activity tolerance, and education about bracing and precautions. Anticipate pt will be able to D/C home with HH and family.

## 2018-03-13 NOTE — PLAN OF CARE
Problem: Patient Care Overview  Goal: Plan of Care Review  Outcome: Ongoing (interventions implemented as appropriate)   03/13/18 7643   Coping/Psychosocial   Plan of Care Reviewed With patient   Plan of Care Review   Progress declining   OTHER   Outcome Summary pt very drowsy, difficult to keep awake,pt disoriented thought he was in [place]. mod assist to trans to EOB, sit-stand min-mod of 2, pt amb 40 feet min of 2 HHA. Pt required max assist to irma/doff LSO.pt would benefit from cont therapy

## 2018-03-13 NOTE — THERAPY EVALUATION
Acute Care - Occupational Therapy Initial Evaluation  [facility]     Patient Name: Denver G Morris  : 1935  MRN: [id number]  Today's Date: 3/13/2018  Onset of Illness/Injury or Date of Surgery: 18  Date of Referral to OT: 18  Referring Physician: Dr. Larsen    Admit Date: 3/12/2018       ICD-10-CM ICD-9-CM   1. Impaired gait and mobility R26.89 781.2   2. Impaired mobility and ADLs Z74.09 799.89     Patient Active Problem List   Diagnosis   • Lumbar stenosis     Past Medical History:   Diagnosis Date   • Arthritis    • Atrial fibrillation     HX OF SINCE    • Hiatal hernia    • Hypertension      Past Surgical History:   Procedure Laterality Date   • APPENDECTOMY     • CERVICAL FUSION     • CHOLECYSTECTOMY     • COLONOSCOPY W/ BIOPSIES AND POLYPECTOMY     • HERNIA REPAIR      X 2   • JOINT REPLACEMENT Right    • LUMBAR FUSION Left 3/12/2018    Procedure: LEFT LATERAL LUMBAR  INTERBODY FUSION L3-5;  Surgeon: LINDA Larsen MD;  Location: [facility];  Service: Orthopedic Spine          OT ASSESSMENT FLOWSHEET (last 72 hours)      Occupational Therapy Evaluation     Row Name 18 1040                   OT Evaluation Time/Intention    Subjective Information complains of;pain;weakness;fatigue;numbness   chronic numbness B feet  -MM        Document Type evaluation   see MAR  -MM        Mode of Treatment occupational therapy  -MM        Patient Effort good  -MM        Symptoms Noted During/After Treatment fatigue  -MM           General Information    Patient Profile Reviewed? yes  -MM        Onset of Illness/Injury or Date of Surgery 18  -MM        Referring Physician Dr. Larsen  -MM        Patient Observations alert;cooperative;agree to therapy  -MM        General Observations of Patient awake and alert sitting EOB, LSO donned  -MM        Prior Level of Function independent:;all household mobility;community mobility;ADL's   SC outside, reaches for furniture in house  -MM         Equipment Currently Used at Home walker, rolling;shower chair;cane, quad  -MM        Existing Precautions/Restrictions fall;brace worn when out of bed;spinal  -MM        Risks Reviewed patient:;LOB;nausea/vomiting;dizziness;increased discomfort  -MM        Benefits Reviewed patient:;improve function;increase independence;increase strength;increase balance  -MM        Barriers to Rehab medically complex;physical barrier  -MM           Relationship/Environment    Lives With spouse  -MM           Resource/Environmental Concerns    Current Living Arrangements home/apartment/condo  -MM           Home Main Entrance    Number of Stairs, Main Entrance five  -MM        Stair Railings, Main Entrance railings on both sides of stairs  -MM           Cognitive Assessment/Intervention- PT/OT    Orientation Status (Cognition) oriented x 4  -MM        Follows Commands (Cognition) WNL  -MM           Safety Issues, Functional Mobility    Safety Issues Affecting Function (Mobility) insight into deficits/self awareness  -MM        Impairments Affecting Function (Mobility) balance;pain;strength  -MM           Bed Mobility Assessment/Treatment    Comment (Bed Mobility) sitting EOB  -MM           Functional Mobility    Functional Mobility- Ind. Level 2 person assist required;minimum assist (75% patient effort);verbal cues required  -MM        Functional Mobility- Device --   HHA x1-2  -MM        Functional Mobility- Safety Issues step length decreased;weight-shifting ability decreased  -MM        Functional Mobility- Comment down leyva, increased BLE weakness with mobility  -MM           Transfer Assessment/Treatment    Transfer Assessment/Treatment sit-stand transfer;stand-sit transfer  -MM        Sit-Stand [place] (Transfers) stand by assist  -MM        Stand-Sit [place] (Transfers) contact guard;verbal cues  -MM           Sit-Stand Transfer    Assistive Device (Sit-Stand Transfers) --   reached for furniture  -MM           ADL  Assessment/Intervention    BADL Assessment/Intervention upper body dressing;toileting  -MM           Upper Body Dressing Assessment/Training    Upper Body Dressing [place] Level doff;don;moderate assist (50% patient effort);verbal cues   LSO  -MM        Upper Body Dressing Position supported sitting  -MM           Toileting Assessment/Training    [place] Level (Toileting) set up;conditional independence;toileting skills  -MM        Assistive Devices (Toileting) urinal  -MM        Toileting Position supported sitting  -MM           General ROM    GENERAL ROM COMMENTS BUE AROM WFL  -MM           General Assessment (Manual Muscle Testing)    Comment, General Manual Muscle Testing (MMT) Assessment BUE functionally 4/5  -MM           Positioning and Restraints    Pre-Treatment Position in bed  -MM        Post Treatment Position chair  -MM        In Chair sitting;call light within reach;encouraged to call for assist  -MM           Pain Assessment    Additional Documentation Pain Scale: Numbers Pre/Post-Treatment (Group)  -MM           Pain Scale: Numbers Pre/Post-Treatment    Pain Scale: Numbers, Pretreatment 5/10  -MM        Pain Location - Orientation lower  -MM        Pain Location back  -MM        Pre/Post Treatment Pain Comment tolerated  -MM        Pain Intervention(s) Repositioned;Medication (See MAR)  -MM           Orthotics & Prosthetics Management    Orthosis Location spinal orthosis  -MM           Spinal Orthosis Management    Type (Spinal Orthosis) LSO (lumbar sacral orthosis)  -MM        Therapeutic Indications (Spinal Orthosis) post-op positioning/protection;rest/inflammation reduction;stabilization and support  -MM        Wearing Schedule (Spinal Orthosis) wear when out of bed only  -MM        Orthosis Training (Spinal Orthosis) patient;activity limitations/precautions;donning/doffing orthosis;orthosis adjustment;orthosis maintenance;purpose/goals of orthosis;wearing schedule  -MM         Compliance/Wearing Issues (Spinal Orthosis) patient/caregiver comprehend strategies;patient/caregiver comprehend rationale for orthosis  -MM           Wound 03/12/18 1125 Left lateral flank surgical    Wound - Properties Group Date first assessed: 03/12/18  -BC Time first assessed: 1125  -BC Present On Admission : yes  -BC Side: Left  -BC Orientation: lateral  -BC Location: flank  -BC Type: surgical  -BC Stage, Pressure Injury: other (see comments)  -BC, surgical site  Additional Comments: mastisol, steri, mepilex placed as dressing  -BC       Plan of Care Review    Plan of Care Reviewed With patient  -MM           Clinical Impression (OT)    Date of Referral to OT 03/12/18  -MM        OT Diagnosis impaired mobility and adls  -MM        Prognosis (OT Eval) good  -MM        Functional Level at Time of Evaluation (OT Eval) good  -MM        Patient/Family Goals Statement (OT Eval) return home  -MM        Criteria for Skilled Therapeutic Interventions Met (OT Eval) yes;treatment indicated  -MM        Rehab Potential (OT Eval) good, to achieve stated therapy goals  -MM        Therapy Frequency (OT Eval) 5 times/wk  -MM        Predicted Duration of Therapy Intervention (OT Eval) until d/c  -MM        Care Plan Review (OT) evaluation/treatment results reviewed;care plan/treatment goals reviewed;risks/benefits reviewed;current/potential barriers reviewed;patient/other agree to care plan  -MM        Anticipated Equipment Needs at Discharge (OT) bathing equipment;dressing equipment   AE PRN  -MM        Anticipated Discharge Disposition (OT) home with home health  -MM           Planned OT Interventions    Planned Therapy Interventions (OT Eval) activity tolerance training;adaptive equipment training;BADL retraining;edema control/reduction;IADL retraining;occupation/activity based interventions;orthotic fabrication/fitting/training;patient/caregiver education/training;ROM/therapeutic exercise;strengthening  exercise;transfer/mobility retraining  -MM           OT Goals    Dressing Goal Selection (OT) dressing, OT goal 1;dressing, OT goal 2  -MM        Toileting Goal Selection (OT) toileting, OT goal 1  -MM           Dressing Goal 1 (OT)    Activity/Assistive Device (Dressing Goal 1, OT) upper body dressing   LSO  -MM        [place]/Cues Needed (Dressing Goal 1, OT) conditional independence;set-up required  -MM        Time Frame (Dressing Goal 1, OT) 10 days  -MM        Barriers (Dressing Goal 1, OT) spinal precautions  -MM        Progress/Outcome (Dressing Goal 1, OT) goal ongoing  -MM           Dressing Goal 2 (OT)    Activity/Assistive Device (Dressing Goal 2, OT) lower body dressing  -MM        [place]/Cues Needed (Dressing Goal 2, OT) conditional independence;set-up required   AE PRN  -MM        Time Frame (Dressing Goal 2, OT) 10 days  -MM        Barriers (Dressing Goal 2, OT) spinal precautions  -MM        Progress/Outcome (Dressing Goal 2, OT) goal ongoing  -MM           Toileting Goal 1 (OT)    Activity/Device (Toileting Goal 1, OT) toileting skills, all  -MM        [place] Level/Cues Needed (Toileting Goal 1, OT) independent  -MM        Time Frame (Toileting Goal 1, OT) 10 days  -MM        Barriers (Toileting Goal 1, OT) spinal precautions  -MM        Progress/Outcome (Toileting Goal 1, OT) goal ongoing  -MM           Patient Education Goal (OT)    Activity (Patient Education Goal, OT) spinal precautions, bracing, home safety, AE, transfer  -MM        [place]/Cues/Accuracy (Memory Goal 2, OT) demonstrates adequately;independent;verbalizes understanding  -MM        Time Frame (Patient Education Goal, OT) 10 days  -MM        Barriers (Patient Education Goal, OT) .  -MM        Progress/Outcome (Patient Education Goal, OT) goal ongoing  -MM           Living Environment    Home Accessibility stairs to enter home   walk in shower  -MM          User Key  (r) = Recorded By, (t) = Taken By, (c) =  Cosigned By    Initials Name Effective Dates    BC Paul Vuong RN 08/02/16 -     MM Hector Vera, OTR/L 03/07/18 -            Occupational Therapy Education     Title: PT OT SLP Therapies (Active)     Topic: Occupational Therapy (Active)     Point: ADL training (Done)     Description: Instruct learner(s) on proper safety adaptation and remediation techniques during self care or transfers.   Instruct in proper use of assistive devices.   Learning Progress Summary     Learner Status Readiness Method Response Comment Documented by    Patient Done Acceptance E DU OT role, benefits, and POC, spinal precautions and bracing  03/13/18 1339                      User Key     Initials Effective Dates Name Provider Type Discipline     03/07/18 -  Hector Vera, OTR/L Occupational Therapist OT                  OT Recommendation and Plan  Anticipated Equipment Needs at Discharge (OT): bathing equipment, dressing equipment (AE PRN)  Anticipated Discharge Disposition (OT): home with home health  Planned Therapy Interventions (OT Eval): activity tolerance training, adaptive equipment training, BADL retraining, edema control/reduction, IADL retraining, occupation/activity based interventions, orthotic fabrication/fitting/training, patient/caregiver education/training, ROM/therapeutic exercise, strengthening exercise, transfer/mobility retraining  Therapy Frequency (OT Eval): 5 times/wk  Plan of Care Review  Plan of Care Reviewed With: patient  Plan of Care Reviewed With: patient  Outcome Summary: OT eval completed. Pt was CGA with cues, min A with HHA for functional mobiltiy. Pt's BLE became weaker with mobility and begun buckling. Pt was mod to irma/doff LSO. pt was set up assist for toileting via urinal. Skilled OT recommended to address adls, functional mobility and education. Recommended d/c home with HH.          Outcome Measures     Row Name 03/13/18 1300 03/13/18 1039          How much help from another person do  you currently need...    Turning from your back to your side while in flat bed without using bedrails?  -- 3  -PB     Moving from lying on back to sitting on the side of a flat bed without bedrails?  -- 3  -PB     Moving to and from a bed to a chair (including a wheelchair)?  -- 3  -PB     Standing up from a chair using your arms (e.g., wheelchair, bedside chair)?  -- 3  -PB     Climbing 3-5 steps with a railing?  -- 3  -PB     To walk in hospital room?  -- 3  -PB     AM-PAC 6 Clicks Score  -- 18  -PB        How much help from another is currently needed...    Putting on and taking off regular lower body clothing? 2  -MM  --     Bathing (including washing, rinsing, and drying) 2  -MM  --     Toileting (which includes using toilet bed pan or urinal) 2  -MM  --     Putting on and taking off regular upper body clothing 2  -MM  --     Taking care of personal grooming (such as brushing teeth) 3  -MM  --     Eating meals 3  -MM  --     Score 14  -MM  --        Functional Assessment    Outcome Measure Options AM-PAC 6 Clicks Daily Activity (OT)  -MM AM-PAC 6 Clicks Basic Mobility (PT)  -PB       User Key  (r) = Recorded By, (t) = Taken By, (c) = Cosigned By    Initials Name Provider Type    PB Milad Archibald, PT DPT Physical Therapist    MM Hector Vera OTR/L Occupational Therapist          Time Calculation:   OT Start Time: 1039  OT Stop Time: 1111 (additional 10 minutes earlier am)  OT Time Calculation (min): 32 min    Therapy Charges for Today     Code Description Service Date Service Provider Modifiers Qty    [id number]  OT SELFCARE CURRENT 3/13/2018 Hector Vera OTR/L GO, CK 1    [id number] HC OT SELFCARE PROJECTED 3/13/2018 PETE Christensen/L GO, CI 1    [id number]  OT EVAL LOW COMPLEXITY 3 3/13/2018 PETE Christensen/L GO, KX 1          OT G-codes  OT Professional Judgement Used?: Yes  OT Functional Scales Options: AM-PAC 6 Clicks Daily Activity (OT)  Score: 14  Functional Limitation:  Self care  Self Care Current Status (): At least 40 percent but less than 60 percent impaired, limited or restricted  Self Care Goal Status (): At least 1 percent but less than 20 percent impaired, limited or restricted    Hector Vera OTR/KELLY  3/13/2018

## 2018-03-14 VITALS
HEART RATE: 86 BPM | HEIGHT: 67 IN | TEMPERATURE: 98.3 F | DIASTOLIC BLOOD PRESSURE: 79 MMHG | SYSTOLIC BLOOD PRESSURE: 135 MMHG | RESPIRATION RATE: 18 BRPM | BODY MASS INDEX: 28.25 KG/M2 | WEIGHT: 180 LBS | OXYGEN SATURATION: 99 %

## 2018-03-14 PROCEDURE — 97116 GAIT TRAINING THERAPY: CPT

## 2018-03-14 PROCEDURE — 97535 SELF CARE MNGMENT TRAINING: CPT

## 2018-03-14 RX ADMIN — OXYCODONE HYDROCHLORIDE AND ACETAMINOPHEN 1 TABLET: 10; 325 TABLET ORAL at 04:39

## 2018-03-14 RX ADMIN — METOPROLOL TARTRATE 25 MG: 25 TABLET, FILM COATED ORAL at 08:53

## 2018-03-14 RX ADMIN — FAMOTIDINE 20 MG: 20 TABLET, FILM COATED ORAL at 08:53

## 2018-03-14 RX ADMIN — LOSARTAN POTASSIUM 50 MG: 50 TABLET ORAL at 08:53

## 2018-03-14 RX ADMIN — DIGOXIN 125 MCG: 0.12 TABLET ORAL at 12:33

## 2018-03-14 RX ADMIN — FUROSEMIDE 20 MG: 20 TABLET ORAL at 08:53

## 2018-03-14 NOTE — THERAPY TREATMENT NOTE
Acute Care - Physical Therapy Treatment Note  [facility]     Patient Name: Denver G Morris  : 1935  MRN: [id number]  Today's Date: 3/14/2018  Onset of Illness/Injury or Date of Surgery: 18  Date of Referral to PT: 18  Referring Physician: Dr. Larsen    Admit Date: 3/12/2018    Visit Dx:    ICD-10-CM ICD-9-CM   1. Impaired gait and mobility R26.89 781.2   2. Impaired mobility and ADLs Z74.09 799.89     Patient Active Problem List   Diagnosis   • Lumbar stenosis       Therapy Treatment    Therapy Treatment / Health Promotion    Treatment Time/Intention  Discipline: physical therapy assistant (Clarissa Lopez PTA)  Document Type: therapy note (daily note) (Clarissa Lopez PTA)  Subjective Information: complains of, pain, weakness (Clarissa Lopez PTA)  Comment: pt still eating (Clarissa Lopez PTA)  Existing Precautions/Restrictions: brace worn when out of bed, fall, spinal (Clarissa Lopez PTA)  Plan of Care Review  Plan of Care Reviewed With: patient (Clarissa Lopez PTA)    Vitals/Pain/Safety  Pain Assessment  Additional Documentation: Pain Scale: Numbers Pre/Post-Treatment (Group) (Clarissa Lopez PTA)  Pain Scale: Numbers Pre/Post-Treatment  Pain Scale: Numbers, Pretreatment: 7/10 (Clarissa Lopez PTA)  Pain Location - Side: Left (Clarissa Lopez PTA)  Pain Location - Orientation: lower (Clarissa Lopez PTA)  Pain Location: back (Clarissa Lopez PTA)  Pain Intervention(s): Repositioned, Ambulation/increased activity (Clarissa Lopez PTA)  Pain Scale: Word Pre/Post-Treatment  Pain Location - Side: Left (Clarissa Lopez PTA)  Pain Location - Orientation: lower (Clarissa Lopez PTA)  Pain Location: back (Clarissa Lopez PTA)  Pain Intervention(s): Repositioned, Ambulation/increased activity (Clarissa Lopez PTA)  Pain Scale: FACES Pre/Post-Treatment  Pain Location - Side: Left (Clarissa Lopez PTA)  Pain Location - Orientation: lower (Clarissa Lopez PTA)  Pain Location: back (Clarissa Lopez  RUSSELL)  Pain Intervention(s): Repositioned, Ambulation/increased activity (Clarissa Lopez PTA)  Positioning and Restraints  Pre-Treatment Position: sitting in chair/recliner (Clarissa Lopez PTA)  Post Treatment Position: chair (Clarissa Lopez PTA)  In Chair: sitting, call light within reach, encouraged to call for assist, notified nsg (Clarissa Lopez PTA)    Mobility,ADL,Motor, Modality  Bed Mobility Assessment/Treatment  Comment (Bed Mobility): chair (Clarissa Lopez PTA)  Transfer Assessment/Treatment  Transfer Assessment/Treatment: sit-stand transfer, stand-sit transfer (Clarissa Lopez PTA)  Sit-Stand Transfer  Sit-Stand [place] (Transfers): contact guard, verbal cues (Clarissa Lopez PTA)  Stand-Sit Transfer  Stand-Sit [place] (Transfers): contact guard, verbal cues (Clarissa Lopez PTA)  Gait/Stairs Assessment/Training  [place] Level (Gait): minimum assist (75% patient effort), contact guard, verbal cues (Clarissa Lopez PTA)  Assistive Device (Gait): walker, front-wheeled (Clarissa Lopez PTA)  Distance in Feet (Gait): 75 (75 x 2) (Clarissa Lopez PTA)  Deviations/Abnormal Patterns (Gait): yoshi decreased, antalgic (flex forward) (Clarissa Lopez PTA)  [place] Level (Stairs): minimum assist (75% patient effort), contact guard, verbal cues (Clarissa Lopez PTA)  Handrail Location (Stairs): both sides (Clarissa Lopez PTA)  Number of Steps (Stairs): 5 (Clarissa Lopez PTA)  Ascending Technique (Stairs): step-to-step (Clarissa Lopez PTA)  Descending Technique (Stairs): step-to-step (Clarissa Lopez PTA)  Stairs, Impairments: strength decreased, impaired balance, pain (Clarissa Lopez PTA)                 ROM/MMT             Sensory, Edema, Orthotics     Orthotic/Prosthetic Management  Orthosis Location: spinal orthosis (Clarissa Lopez PTA)  Spinal Orthosis Management  Type (Spinal Orthosis): LSO (lumbar sacral orthosis) (Clarissa Lopez PTA)  Wearing Schedule (Spinal Orthosis): wear  when out of bed only (Clarissa Lopez, PTA)    Cognition, Communication, Swallow       Outcome Summary               PT Rehab Goals     Row Name 03/13/18 1039             Bed Mobility Goal 1 (PT)    Activity/Assistive Device (Bed Mobility Goal 1, PT) bed mobility activities, all  -PB      [place] Level/Cues Needed (Bed Mobility Goal 1, PT) independent  -PB      Time Frame (Bed Mobility Goal 1, PT) long term goal (LTG);by discharge  -PB      Barriers (Bed Mobility Goal 1, PT) spinal precautions  -PB      Progress/Outcomes (Bed Mobility Goal 1, PT) goal ongoing  -PB         Transfer Goal 1 (PT)    Activity/Assistive Device (Transfer Goal 1, PT) sit-to-stand/stand-to-sit;bed-to-chair/chair-to-bed  -PB      [place] Level/Cues Needed (Transfer Goal 1, PT) independent  -PB      Time Frame (Transfer Goal 1, PT) long term goal (LTG);by discharge  -PB      Barriers (Transfers Goal 1, PT) spinal precautions  -PB      Progress/Outcome (Transfer Goal 1, PT) goal ongoing  -PB         Gait Training Goal 1 (PT)    Activity/Assistive Device (Gait Training Goal 1, PT) gait (walking locomotion);cane, quad  -PB      [place] Level (Gait Training Goal 1, PT) conditional independence  -PB      Distance (Gait Goal 1, PT) 250  -PB      Time Frame (Gait Training Goal 1, PT) long term goal (LTG);by discharge  -PB      Barriers (Gait Training Goal 1, PT) spinal precautions, LE weakness  -PB      Progress/Outcome (Gait Training Goal 1, PT) goal ongoing  -PB         Stairs Goal 1 (PT)    Activity/Assistive Device (Stairs Goal 1, PT) ascending stairs;descending stairs;cane, quad   and handrail  -PB      [place] Level/Cues Needed (Stairs Goal 1, PT) contact guard assist  -PB      Number of Stairs (Stairs Goal 1, PT) 5  -PB      Time Frame (Stairs Goal 1, PT) long term goal (LTG);by discharge  -PB      Barriers (Stairs Goal 1, PT) spinal precautions and LE weakness  -PB      Progress/Outcome (Stairs Goal 1, PT) goal ongoing  -PB          Patient Education Goal (PT)    Activity (Patient Education Goal, PT) demonstrate irma/doff LSO, verbalize spinal precautions  -PB      [place]/Cues/Accuracy (Memory Goal 2, PT) demonstrates adequately  -PB      Time Frame (Patient Education Goal, PT) long term goal (LTG);by discharge  -PB      Barriers (Patient Education Goal, PT) none  -PB      Progress/Outcome (Patient Education Goal, PT) goal ongoing  -PB        User Key  (r) = Recorded By, (t) = Taken By, (c) = Cosigned By    Initials Name Provider Type    PB Milad Archibald, PT DPT Physical Therapist          Physical Therapy Education     Title: PT OT SLP Therapies (Active)     Topic: Physical Therapy (Active)     Point: Mobility training (Done)    Learning Progress Summary     Learner Status Readiness Method Response Comment Documented by    Patient Done Acceptance E,D VU rwx  03/14/18 1132     Done Acceptance E VU,NR LSO, precautions, transfers, benefits of activity  03/13/18 1124          Point: Body mechanics (Done)    Learning Progress Summary     Learner Status Readiness Method Response Comment Documented by    Patient Done Acceptance E VU,NR LSO, precautions, transfers, benefits of activity  03/13/18 1124          Point: Precautions (Done)    Learning Progress Summary     Learner Status Readiness Method Response Comment Documented by    Patient Done Acceptance E VU,NR LSO, precautions, transfers, benefits of activity  03/13/18 1124                      User Key     Initials Effective Dates Name Provider Type [facility] 08/02/16 -  Clarissa Lopez, PTA Physical Therapy Assistant PT     08/02/16 -  Milad Archibald, PT DPT Physical Therapist PT                    PT Recommendation and Plan     Plan of Care Reviewed With: patient  Progress: improving  Outcome Summary: pt in chair with LSO, pt stood from chair cga, pt amb 100 feet with rwx cga-min assist, pt would benefit from HH           Outcome Measures     Row Name 03/14/18  1145 03/14/18 1100 03/13/18 1300       How much help from another person do you currently need...    Turning from your back to your side while in flat bed without using bedrails?  -- 3  -AH  --    Moving from lying on back to sitting on the side of a flat bed without bedrails?  -- 3  -AH  --    Moving to and from a bed to a chair (including a wheelchair)?  -- 3  -AH  --    Standing up from a chair using your arms (e.g., wheelchair, bedside chair)?  -- 3  -AH  --    Climbing 3-5 steps with a railing?  -- 2  -AH  --    To walk in hospital room?  -- 3  -AH  --    AM-PAC 6 Clicks Score  -- 17  -AH  --       How much help from another is currently needed...    Putting on and taking off regular lower body clothing? 2  -TS  -- 2  -MM    Bathing (including washing, rinsing, and drying) 3  -TS  -- 2  -MM    Toileting (which includes using toilet bed pan or urinal) 3  -TS  -- 2  -MM    Putting on and taking off regular upper body clothing 3  -TS  -- 2  -MM    Taking care of personal grooming (such as brushing teeth) 3  -TS  -- 3  -MM    Eating meals 4  -TS  -- 3  -MM    Score 18  -TS  -- 14  -MM       Functional Assessment    Outcome Measure Options AM-PAC 6 Clicks Daily Activity (OT)  -TS AM-PAC 6 Clicks Basic Mobility (PT)  - AM-PAC 6 Clicks Daily Activity (OT)  -MM    Row Name 03/13/18 1039             How much help from another person do you currently need...    Turning from your back to your side while in flat bed without using bedrails? 3  -PB      Moving from lying on back to sitting on the side of a flat bed without bedrails? 3  -PB      Moving to and from a bed to a chair (including a wheelchair)? 3  -PB      Standing up from a chair using your arms (e.g., wheelchair, bedside chair)? 3  -PB      Climbing 3-5 steps with a railing? 3  -PB      To walk in hospital room? 3  -PB      AM-PAC 6 Clicks Score 18  -PB         Functional Assessment    Outcome Measure Options AM-PAC 6 Clicks Basic Mobility (PT)  -PB         User Key  (r) = Recorded By, (t) = Taken By, (c) = Cosigned By    Initials Name Provider Type     Clarissa Lopez PTA Physical Therapy Assistant    TS Cecilia Mendoza, VILLEGAS/L Occupational Therapy Assistant    PB Milad Archibald, PT DPT Physical Therapist    MM Hector Vera, OTR/L Occupational Therapist           Time Calculation:         PT Charges     Row Name 03/14/18 1412 03/14/18 1135          Time Calculation    Start Time 1328  -AH 1049  -AH     Stop Time 1358  -AH 1105  -AH     Time Calculation (min) 30 min  -AH 16 min  -AH     PT Received On 03/14/18  - 03/14/18  -     PT Goal Re-Cert Due Date 03/23/18  - 03/23/18  -        Time Calculation- PT    Total Timed Code Minutes- PT 30 minute(s)  -AH 16 minute(s)  -AH       User Key  (r) = Recorded By, (t) = Taken By, (c) = Cosigned By    Initials Name Provider Type     Clarissa Lopez PTA Physical Therapy Assistant          Therapy Charges for Today     Code Description Service Date Service Provider Modifiers Qty    60788639907 HC GAIT TRAINING EA 15 MIN 3/13/2018 Clarissa Lopez PTA GP, KX 1    98120916636 HC PT THERAPEUTIC ACT EA 15 MIN 3/13/2018 Clarissa Lopez PTA GP, KX 1    33651343705 HC GAIT TRAINING EA 15 MIN 3/14/2018 Clarissa Lopez PTA GP, KX 1    95062223658 HC GAIT TRAINING EA 15 MIN 3/14/2018 Clarissa Lopez PTA GP, KX 2          PT G-Codes  Outcome Measure Options: AM-PAC 6 Clicks Daily Activity (OT)  Score: 18  Functional Limitation: Mobility: Walking and moving around  Mobility: Walking and Moving Around Current Status (): At least 40 percent but less than 60 percent impaired, limited or restricted  Mobility: Walking and Moving Around Goal Status (): At least 1 percent but less than 20 percent impaired, limited or restricted    Clarissa Lopez PTA  3/14/2018

## 2018-03-14 NOTE — THERAPY TREATMENT NOTE
Acute Care - Occupational Therapy Treatment Note  [facility]     Patient Name: Denver G Morris  : 1935  MRN: [id number]  Today's Date: 3/14/2018  Onset of Illness/Injury or Date of Surgery: 18  Date of Referral to OT: 18  Referring Physician: Dr. Larsen    Admit Date: 3/12/2018       ICD-10-CM ICD-9-CM   1. Impaired gait and mobility R26.89 781.2   2. Impaired mobility and ADLs Z74.09 799.89     Patient Active Problem List   Diagnosis   • Lumbar stenosis     Past Medical History:   Diagnosis Date   • Arthritis    • Atrial fibrillation     HX OF SINCE    • Hiatal hernia    • Hypertension      Past Surgical History:   Procedure Laterality Date   • APPENDECTOMY     • CERVICAL FUSION     • CHOLECYSTECTOMY     • COLONOSCOPY W/ BIOPSIES AND POLYPECTOMY     • HERNIA REPAIR      X 2   • JOINT REPLACEMENT Right    • LUMBAR FUSION Left 3/12/2018    Procedure: LEFT LATERAL LUMBAR  INTERBODY FUSION L3-5;  Surgeon: LINDA Larsen MD;  Location: [facility];  Service: Orthopedic Spine       Therapy Treatment    Therapy Treatment / Health Promotion    Treatment Time/Intention  Discipline: occupational therapy assistant  Document Type: therapy note (daily note)  Subjective Information: no complaints  Existing Precautions/Restrictions: brace worn when out of bed, fall, spinal  Plan of Care Review  Plan of Care Reviewed With: patient    Vitals/Pain/Safety  Positioning and Restraints  Pre-Treatment Position: sitting in chair/recliner  Post Treatment Position: chair  In Chair: sitting, call light within reach, encouraged to call for assist, with PT, with brace, with nsg    Mobility,ADL,Motor, Modality  Transfer Assessment/Treatment  Transfer Assessment/Treatment: sit-stand transfer, stand-sit transfer  Comment (Transfers): discussed and encouraged pt to consider BSC for home for increased safety. Per pt he has walk in shower and shower seat   Sit-Stand Transfer  Sit-Stand [place] (Transfers): contact  guard  Assistive Device (Sit-Stand Transfers): walker, front-wheeled  Stand-Sit Transfer  Stand-Sit [place] (Transfers): contact guard, stand by assist  Assistive Device (Stand-Sit Transfers): walker, front-wheeled  ADL Assessment/Intervention  BADL Assessment/Intervention: upper body dressing, lower body dressing  Upper Body Dressing Assessment/Training  Upper Body Dressing [place] Level: don, doff (LSO)  Upper Body Dressing Position: unsupported sitting, supported standing  Comment (Upper Body Dressing): Pt educated on placement and fit of LSO  Lower Body Dressing Assessment/Training  Lower Body Dressing [place] Level: don, socks, undergarment, minimum assist (75% patient effort)              ROM/MMT             Sensory, Edema, Orthotics     Spinal Orthosis Management  Orthosis Training (Spinal Orthosis): donning/doffing orthosis, purpose/goals of orthosis, activity limitations/precautions, wearing schedule    Cognition, Communication, Swallow  Cognitive Assessment/Intervention- PT/OT  Personal Safety Interventions: fall prevention program maintained, gait belt, nonskid shoes/slippers when out of bed    Outcome Summary           OT Rehab Goals     Row Name 03/13/18 1040             Dressing Goal 1 (OT)    Activity/Assistive Device (Dressing Goal 1, OT) upper body dressing   LSO  -MM      [place]/Cues Needed (Dressing Goal 1, OT) conditional independence;set-up required  -MM      Time Frame (Dressing Goal 1, OT) 10 days  -MM      Barriers (Dressing Goal 1, OT) spinal precautions  -MM      Progress/Outcome (Dressing Goal 1, OT) goal ongoing  -MM         Dressing Goal 2 (OT)    Activity/Assistive Device (Dressing Goal 2, OT) lower body dressing  -MM      [place]/Cues Needed (Dressing Goal 2, OT) conditional independence;set-up required   AE PRN  -MM      Time Frame (Dressing Goal 2, OT) 10 days  -MM      Barriers (Dressing Goal 2, OT) spinal precautions  -MM      Progress/Outcome (Dressing  Goal 2, OT) goal ongoing  -MM         Toileting Goal 1 (OT)    Activity/Device (Toileting Goal 1, OT) toileting skills, all  -MM      [place] Level/Cues Needed (Toileting Goal 1, OT) independent  -MM      Time Frame (Toileting Goal 1, OT) 10 days  -MM      Barriers (Toileting Goal 1, OT) spinal precautions  -MM      Progress/Outcome (Toileting Goal 1, OT) goal ongoing  -MM         Patient Education Goal (OT)    Activity (Patient Education Goal, OT) spinal precautions, bracing, home safety, AE, transfer  -MM      [place]/Cues/Accuracy (Memory Goal 2, OT) demonstrates adequately;independent;verbalizes understanding  -MM      Time Frame (Patient Education Goal, OT) 10 days  -MM      Barriers (Patient Education Goal, OT) .  -MM      Progress/Outcome (Patient Education Goal, OT) goal ongoing  -MM        User Key  (r) = Recorded By, (t) = Taken By, (c) = Cosigned By    Initials Name Provider Type    MM Hector Vera, OTR/L Occupational Therapist        Occupational Therapy Education     Title: PT OT SLP Therapies (Active)     Topic: Occupational Therapy (Active)     Point: ADL training (Done)     Description: Instruct learner(s) on proper safety adaptation and remediation techniques during self care or transfers.   Instruct in proper use of assistive devices.   Learning Progress Summary     Learner Status Readiness Method Response Comment Documented by    Patient Done Acceptance E VU LSO, back precautions, LB dressing, DME TS 03/14/18 1147     Done Acceptance E DU OT role, benefits, and POC, spinal precautions and bracing MM 03/13/18 1337          Point: Precautions (Done)     Description: Instruct learner(s) on prescribed precautions during self-care and functional transfers.   Learning Progress Summary     Learner Status Readiness Method Response Comment Documented by    Patient Done Acceptance E VU LSO, back precautions, LB dressing, DME TS 03/14/18 1147                      User Key     Initials Effective  Dates Name Provider Type Discipline     08/02/16 -  BAKARI Jorge/L Occupational Therapy Assistant OT     03/07/18 -  PETE Christensen/L Occupational Therapist OT                  OT Recommendation and Plan  Anticipated Equipment Needs at Discharge (OT): bathing equipment, dressing equipment (AE PRN)  Anticipated Discharge Disposition (OT): home with home health  Planned Therapy Interventions (OT Eval): activity tolerance training, adaptive equipment training, BADL retraining, edema control/reduction, IADL retraining, occupation/activity based interventions, orthotic fabrication/fitting/training, patient/caregiver education/training, ROM/therapeutic exercise, strengthening exercise, transfer/mobility retraining  Therapy Frequency (OT Eval): 5 times/wk  Plan of Care Review  Plan of Care Reviewed With: patient  Plan of Care Reviewed With: patient  Progress: improving  Outcome Summary: Pt CGA/SBA for transfers. Pt mod A for LB dressing and max A to don socks. Pt educated on back precautions and placement of LSO. Pt would benefit from BSC for home and  OT and PT. Continue OT POC        Outcome Measures     Row Name 03/14/18 1145 03/14/18 1100 03/13/18 1300       How much help from another person do you currently need...    Turning from your back to your side while in flat bed without using bedrails?  -- 3  -AH  --    Moving from lying on back to sitting on the side of a flat bed without bedrails?  -- 3  -AH  --    Moving to and from a bed to a chair (including a wheelchair)?  -- 3  -AH  --    Standing up from a chair using your arms (e.g., wheelchair, bedside chair)?  -- 3  -AH  --    Climbing 3-5 steps with a railing?  -- 2  -AH  --    To walk in hospital room?  -- 3  -AH  --    AM-PAC 6 Clicks Score  -- 17  -AH  --       How much help from another is currently needed...    Putting on and taking off regular lower body clothing? 2  -TS  -- 2  -MM    Bathing (including washing, rinsing, and drying)  3  -TS  -- 2  -MM    Toileting (which includes using toilet bed pan or urinal) 3  -TS  -- 2  -MM    Putting on and taking off regular upper body clothing 3  -TS  -- 2  -MM    Taking care of personal grooming (such as brushing teeth) 3  -TS  -- 3  -MM    Eating meals 4  -TS  -- 3  -MM    Score 18  -TS  -- 14  -MM       Functional Assessment    Outcome Measure Options AM-PAC 6 Clicks Daily Activity (OT)  - AM-PAC 6 Clicks Basic Mobility (PT)  - AM-PAC 6 Clicks Daily Activity (OT)  -MM    Row Name 03/13/18 1039             How much help from another person do you currently need...    Turning from your back to your side while in flat bed without using bedrails? 3  -PB      Moving from lying on back to sitting on the side of a flat bed without bedrails? 3  -PB      Moving to and from a bed to a chair (including a wheelchair)? 3  -PB      Standing up from a chair using your arms (e.g., wheelchair, bedside chair)? 3  -PB      Climbing 3-5 steps with a railing? 3  -PB      To walk in hospital room? 3  -PB      AM-PAC 6 Clicks Score 18  -PB         Functional Assessment    Outcome Measure Options AM-PAC 6 Clicks Basic Mobility (PT)  -PB        User Key  (r) = Recorded By, (t) = Taken By, (c) = Cosigned By    Initials Name Provider Type     Clarissa Lopez, PTA Physical Therapy Assistant     DAVID JorgeA/L Occupational Therapy Assistant    PB Milad Archibald, PT DPT Physical Therapist    MM Hector Vera, OTR/L Occupational Therapist           Time Calculation:         Time Calculation- OT     Row Name 03/14/18 1146             Time Calculation- OT    OT Start Time 1020  -TS      OT Stop Time 1045  -TS      OT Time Calculation (min) 25 min  -TS      Total Timed Code Minutes- OT 25 minute(s)  -TS      OT Received On 03/14/18  -        User Key  (r) = Recorded By, (t) = Taken By, (c) = Cosigned By    Initials Name Provider Type     Cecilia Mendoza, VILLEGAS/L Occupational Therapy Assistant            Therapy Charges for Today     Code Description Service Date Service Provider Modifiers Qty    71137824046 HC OT SELF CARE/MGMT/TRAIN EA 15 MIN 3/14/2018 BAKARI Jorge/L GO, KX 2          OT G-codes  OT Professional Judgement Used?: Yes  OT Functional Scales Options: AM-PAC 6 Clicks Daily Activity (OT)  Score: 14  Functional Limitation: Self care  Self Care Current Status (): At least 40 percent but less than 60 percent impaired, limited or restricted  Self Care Goal Status (): At least 1 percent but less than 20 percent impaired, limited or restricted    PALMER Lopez  3/14/2018

## 2018-03-14 NOTE — PROGRESS NOTES
Continued Stay Note   Chris     Patient Name: Denver G Morris  MRN: [id number]  Today's Date: 3/14/2018    Admit Date: 3/12/2018          Discharge Plan     Row Name 03/14/18 1448       Plan    Plan [facility]    Patient/Family in Agreement with Plan yes    Final Discharge Disposition Code 06 - home with home health care    Final Note Plan discharge today with home health care. Patient has selected [facility]. SW faxed referral to [facility] at [phone]. No other discharge planning needs / orders.              Discharge Codes    No documentation.       Expected Discharge Date and Time     Expected Discharge Date Expected Discharge Time    Mar 13, 2018             ROXIE Gutierrez

## 2018-03-14 NOTE — PLAN OF CARE
Problem: Patient Care Overview  Goal: Plan of Care Review  Outcome: Ongoing (interventions implemented as appropriate)   03/14/18 5627   Coping/Psychosocial   Plan of Care Reviewed With patient   Plan of Care Review   Progress no change   OTHER   Outcome Summary PT denies pain, rested well through the night. Stated the medication he had during the day made him a little loopy, agreed only one pain pill needed. VSS, cont to monitor.        Problem: Laminectomy/Foraminotomy/Discectomy (Adult)  Goal: Signs and Symptoms of Listed Potential Problems Will be Absent, Minimized or Managed (Laminectomy/Foraminotomy/Discectomy)  Outcome: Ongoing (interventions implemented as appropriate)

## 2018-03-14 NOTE — PLAN OF CARE
Problem: Patient Care Overview  Goal: Plan of Care Review  Outcome: Ongoing (interventions implemented as appropriate)   03/14/18 1132   Coping/Psychosocial   Plan of Care Reviewed With patient   Plan of Care Review   Progress improving   OTHER   Outcome Summary pt in chair with LSO, pt stood from chair cga, pt amb 100 feet with rwx cga-min assist, pt would benefit from HH

## 2018-03-14 NOTE — PLAN OF CARE
Problem: Patient Care Overview  Goal: Plan of Care Review  Outcome: Ongoing (interventions implemented as appropriate)   03/14/18 7333   Coping/Psychosocial   Plan of Care Reviewed With patient   Plan of Care Review   Progress improving   OTHER   Outcome Summary Pt CGA/SBA for transfers. Pt mod A for LB dressing and max A to don socks. Pt educated on back precautions and placement of LSO. Pt would benefit from [facility] for home and  OT and PT. Continue OT POC

## 2018-03-14 NOTE — THERAPY TREATMENT NOTE
Acute Care - Physical Therapy Treatment Note  [facility]     Patient Name: Denver G Morris  : 1935  MRN: [id number]  Today's Date: 3/14/2018  Onset of Illness/Injury or Date of Surgery: 18  Date of Referral to PT: 18  Referring Physician: Dr. Larsen    Admit Date: 3/12/2018    Visit Dx:    ICD-10-CM ICD-9-CM   1. Impaired gait and mobility R26.89 781.2   2. Impaired mobility and ADLs Z74.09 799.89     Patient Active Problem List   Diagnosis   • Lumbar stenosis       Therapy Treatment    Therapy Treatment / Health Promotion    Treatment Time/Intention  Discipline: physical therapy assistant  Document Type: therapy note (daily note)  Subjective Information: complains of, pain, weakness  Comment: pt still eating  Existing Precautions/Restrictions: brace worn when out of bed, fall, spinal  Plan of Care Review  Plan of Care Reviewed With: patient    Vitals/Pain/Safety  Pain Assessment  Additional Documentation: Pain Scale: Numbers Pre/Post-Treatment (Group)  Pain Scale: Numbers Pre/Post-Treatment  Pain Scale: Numbers, Pretreatment: 7/10  Pain Location - Side: Left  Pain Location - Orientation: lower  Pain Location: back  Pain Intervention(s): Repositioned, Ambulation/increased activity  Pain Scale: Word Pre/Post-Treatment  Pain Location - Side: Left  Pain Location - Orientation: lower  Pain Location: back  Pain Intervention(s): Repositioned, Ambulation/increased activity  Pain Scale: FACES Pre/Post-Treatment  Pain Location - Side: Left  Pain Location - Orientation: lower  Pain Location: back  Pain Intervention(s): Repositioned, Ambulation/increased activity  Positioning and Restraints  Pre-Treatment Position: sitting in chair/recliner  Post Treatment Position: chair  In Chair: reclined, call light within reach, encouraged to call for assist, notified nsg    Mobility,ADL,Motor, Modality  Bed Mobility Assessment/Treatment  Comment (Bed Mobility): chair  Transfer Assessment/Treatment  Transfer  Assessment/Treatment: sit-stand transfer, stand-sit transfer  Sit-Stand Transfer  Sit-Stand [place] (Transfers): contact guard, verbal cues  Stand-Sit Transfer  Stand-Sit [place] (Transfers): contact guard, verbal cues  Gait/Stairs Assessment/Training  [place] Level (Gait): minimum assist (75% patient effort), contact guard, verbal cues  Assistive Device (Gait): walker, front-wheeled  Distance in Feet (Gait): 100  Deviations/Abnormal Patterns (Gait): yoshi decreased, antalgic (flex forward)                 ROM/MMT             Sensory, Edema, Orthotics     Orthotic/Prosthetic Management  Orthosis Location: spinal orthosis  Spinal Orthosis Management  Type (Spinal Orthosis): LSO (lumbar sacral orthosis)  Wearing Schedule (Spinal Orthosis): wear when out of bed only    Cognition, Communication, Swallow       Outcome Summary               PT Rehab Goals     Row Name 03/13/18 1039             Bed Mobility Goal 1 (PT)    Activity/Assistive Device (Bed Mobility Goal 1, PT) bed mobility activities, all  -PB      [place] Level/Cues Needed (Bed Mobility Goal 1, PT) independent  -PB      Time Frame (Bed Mobility Goal 1, PT) long term goal (LTG);by discharge  -PB      Barriers (Bed Mobility Goal 1, PT) spinal precautions  -PB      Progress/Outcomes (Bed Mobility Goal 1, PT) goal ongoing  -PB         Transfer Goal 1 (PT)    Activity/Assistive Device (Transfer Goal 1, PT) sit-to-stand/stand-to-sit;bed-to-chair/chair-to-bed  -PB      [place] Level/Cues Needed (Transfer Goal 1, PT) independent  -PB      Time Frame (Transfer Goal 1, PT) long term goal (LTG);by discharge  -PB      Barriers (Transfers Goal 1, PT) spinal precautions  -PB      Progress/Outcome (Transfer Goal 1, PT) goal ongoing  -PB         Gait Training Goal 1 (PT)    Activity/Assistive Device (Gait Training Goal 1, PT) gait (walking locomotion);cane, quad  -PB      [place] Level (Gait Training Goal 1, PT) conditional independence  -PB       Distance (Gait Goal 1, PT) 250  -PB      Time Frame (Gait Training Goal 1, PT) long term goal (LTG);by discharge  -PB      Barriers (Gait Training Goal 1, PT) spinal precautions, LE weakness  -PB      Progress/Outcome (Gait Training Goal 1, PT) goal ongoing  -PB         Stairs Goal 1 (PT)    Activity/Assistive Device (Stairs Goal 1, PT) ascending stairs;descending stairs;cane, quad   and handrail  -PB      [place] Level/Cues Needed (Stairs Goal 1, PT) contact guard assist  -PB      Number of Stairs (Stairs Goal 1, PT) 5  -PB      Time Frame (Stairs Goal 1, PT) long term goal (LTG);by discharge  -PB      Barriers (Stairs Goal 1, PT) spinal precautions and LE weakness  -PB      Progress/Outcome (Stairs Goal 1, PT) goal ongoing  -PB         Patient Education Goal (PT)    Activity (Patient Education Goal, PT) demonstrate irma/doff LSO, verbalize spinal precautions  -PB      [place]/Cues/Accuracy (Memory Goal 2, PT) demonstrates adequately  -PB      Time Frame (Patient Education Goal, PT) long term goal (LTG);by discharge  -PB      Barriers (Patient Education Goal, PT) none  -PB      Progress/Outcome (Patient Education Goal, PT) goal ongoing  -PB        User Key  (r) = Recorded By, (t) = Taken By, (c) = Cosigned By    Initials Name Provider Type    LEONARD Archibald, PT DPT Physical Therapist          Physical Therapy Education     Title: PT OT SLP Therapies (Active)     Topic: Physical Therapy (Active)     Point: Mobility training (Done)    Learning Progress Summary     Learner Status Readiness Method Response Comment Documented by    Patient Done Acceptance DANIELA OLIVEIRA rwx  03/14/18 1132     Done Acceptance E VICENTE,MELANIA LSO, precautions, transfers, benefits of activity PB 03/13/18 1124          Point: Body mechanics (Done)    Learning Progress Summary     Learner Status Readiness Method Response Comment Documented by    Patient Done Acceptance E MELANIA ZHANG LSO, precautions, transfers, benefits of activity PB 03/13/18  1124          Point: Precautions (Done)    Learning Progress Summary     Learner Status Readiness Method Response Comment Documented by    Patient Done Acceptance E VU,NR LSO, precautions, transfers, benefits of activity  03/13/18 1124                      User Key     Initials Effective Dates Name Provider Type Discipline     08/02/16 -  Clarissa Lopez, PTA Physical Therapy Assistant PT     08/02/16 -  Milad Archibald, PT DPT Physical Therapist PT                    PT Recommendation and Plan  Anticipated Discharge Disposition (PT): home with home health care  Planned Therapy Interventions (PT Eval): balance training, bed mobility training, gait training, home exercise program, orthotic fitting/training, patient/family education, strengthening, stair training, transfer training  Therapy Frequency (PT Clinical Impression): 2 times/day  Plan of Care Reviewed With: patient  Progress: improving  Outcome Summary: pt in chair with LSO, pt stood from chair cga, pt amb 100 feet with rwx cga-min assist, pt would benefit from HH           Outcome Measures     Row Name 03/14/18 1100 03/13/18 1300 03/13/18 1039       How much help from another person do you currently need...    Turning from your back to your side while in flat bed without using bedrails? 3  -AH  -- 3  -PB    Moving from lying on back to sitting on the side of a flat bed without bedrails? 3  -AH  -- 3  -PB    Moving to and from a bed to a chair (including a wheelchair)? 3  -AH  -- 3  -PB    Standing up from a chair using your arms (e.g., wheelchair, bedside chair)? 3  -AH  -- 3  -PB    Climbing 3-5 steps with a railing? 2  -AH  -- 3  -PB    To walk in hospital room? 3  -AH  -- 3  -PB    AM-PAC 6 Clicks Score 17  -AH  -- 18  -PB       How much help from another is currently needed...    Putting on and taking off regular lower body clothing?  -- 2  -MM  --    Bathing (including washing, rinsing, and drying)  -- 2  -MM  --    Toileting (which includes using  toilet bed pan or urinal)  -- 2  -MM  --    Putting on and taking off regular upper body clothing  -- 2  -MM  --    Taking care of personal grooming (such as brushing teeth)  -- 3  -MM  --    Eating meals  -- 3  -MM  --    Score  -- 14  -MM  --       Functional Assessment    Outcome Measure Options AM-PAC 6 Clicks Basic Mobility (PT)  - AM-PAC 6 Clicks Daily Activity (OT)  -MM AM-PAC 6 Clicks Basic Mobility (PT)  -      User Key  (r) = Recorded By, (t) = Taken By, (c) = Cosigned By    Initials Name Provider Type     Clarissa Lopez PTA Physical Therapy Assistant    PB Miald Archibald, PT DPT Physical Therapist    MM Hector Vera, OTR/L Occupational Therapist           Time Calculation:         PT Charges     Row Name 03/14/18 1135             Time Calculation    Start Time 1049  -      Stop Time 1105  -      Time Calculation (min) 16 min  -      PT Received On 03/14/18  -      PT Goal Re-Cert Due Date 03/23/18  -         Time Calculation- PT    Total Timed Code Minutes- PT 16 minute(s)  -        User Key  (r) = Recorded By, (t) = Taken By, (c) = Cosigned By    Initials Name Provider Type     Clarissa Lopez PTA Physical Therapy Assistant          Therapy Charges for Today     Code Description Service Date Service Provider Modifiers Qty    07611134558 HC GAIT TRAINING EA 15 MIN 3/13/2018 Clarissa Lopez PTA GP, KX 1    57848054840 HC PT THERAPEUTIC ACT EA 15 MIN 3/13/2018 Clarissa Lopez PTA GP, KX 1    99192929365 HC GAIT TRAINING EA 15 MIN 3/14/2018 Clarissa Lopez PTA GP, KX 1          PT G-Codes  Outcome Measure Options: AM-PAC 6 Clicks Basic Mobility (PT)  Score: 18  Functional Limitation: Mobility: Walking and moving around  Mobility: Walking and Moving Around Current Status (): At least 40 percent but less than 60 percent impaired, limited or restricted  Mobility: Walking and Moving Around Goal Status (): At least 1 percent but less than 20 percent impaired, limited or  restricted    Clarissa Lopez, PTA  3/14/2018

## 2018-03-15 NOTE — THERAPY DISCHARGE NOTE
Acute Care - Occupational Therapy Discharge Summary  [facility]     Patient Name: Denver G Morris  : 1935  MRN: [id number]    Today's Date: 3/15/2018       Date of Referral to OT: 18         Admit Date: 3/12/2018        OT Recommendation and Plan    Visit Dx:    ICD-10-CM ICD-9-CM   1. Impaired gait and mobility R26.89 781.2   2. Impaired mobility and ADLs Z74.09 799.89                     OT Rehab Goals     Row Name 03/15/18 0700 18 1040          Dressing Goal 1 (OT)    Activity/Assistive Device (Dressing Goal 1, OT)  -- upper body dressing   LSO  -MM     [place]/Cues Needed (Dressing Goal 1, OT)  -- conditional independence;set-up required  -MM     Time Frame (Dressing Goal 1, OT)  -- 10 days  -MM     Barriers (Dressing Goal 1, OT)  -- spinal precautions  -MM     Progress/Outcome (Dressing Goal 1, OT) goal not met  -TS goal ongoing  -MM        Dressing Goal 2 (OT)    Activity/Assistive Device (Dressing Goal 2, OT)  -- lower body dressing  -MM     [place]/Cues Needed (Dressing Goal 2, OT)  -- conditional independence;set-up required   AE PRN  -MM     Time Frame (Dressing Goal 2, OT)  -- 10 days  -MM     Barriers (Dressing Goal 2, OT)  -- spinal precautions  -MM     Progress/Outcome (Dressing Goal 2, OT) goal not met  -TS goal ongoing  -MM        Toileting Goal 1 (OT)    Activity/Device (Toileting Goal 1, OT)  -- toileting skills, all  -MM     [place] Level/Cues Needed (Toileting Goal 1, OT)  -- independent  -MM     Time Frame (Toileting Goal 1, OT)  -- 10 days  -MM     Barriers (Toileting Goal 1, OT)  -- spinal precautions  -MM     Progress/Outcome (Toileting Goal 1, OT) goal not met  -TS goal ongoing  -MM        Patient Education Goal (OT)    Activity (Patient Education Goal, OT)  -- spinal precautions, bracing, home safety, AE, transfer  -MM     [place]/Cues/Accuracy (Memory Goal 2, OT)  -- demonstrates adequately;independent;verbalizes understanding  -MM     Time Frame  (Patient Education Goal, OT)  -- 10 days  -MM     Barriers (Patient Education Goal, OT)  -- .  -MM     Progress/Outcome (Patient Education Goal, OT) goal not met  -TS goal ongoing  -MM       User Key  (r) = Recorded By, (t) = Taken By, (c) = Cosigned By    Initials Name Provider Type    TS Cecilia Mendoza, VILLEGAS/L Occupational Therapy Assistant    MICHAEL Vera, OTR/L Occupational Therapist                Outcome Measures     Row Name 03/14/18 1145 03/14/18 1100 03/13/18 1300       How much help from another person do you currently need...    Turning from your back to your side while in flat bed without using bedrails?  -- 3  -AH  --    Moving from lying on back to sitting on the side of a flat bed without bedrails?  -- 3  -AH  --    Moving to and from a bed to a chair (including a wheelchair)?  -- 3  -AH  --    Standing up from a chair using your arms (e.g., wheelchair, bedside chair)?  -- 3  -AH  --    Climbing 3-5 steps with a railing?  -- 2  -AH  --    To walk in hospital room?  -- 3  -AH  --    AM-PAC 6 Clicks Score  -- 17  -AH  --       How much help from another is currently needed...    Putting on and taking off regular lower body clothing? 2  -TS  -- 2  -MM    Bathing (including washing, rinsing, and drying) 3  -TS  -- 2  -MM    Toileting (which includes using toilet bed pan or urinal) 3  -TS  -- 2  -MM    Putting on and taking off regular upper body clothing 3  -TS  -- 2  -MM    Taking care of personal grooming (such as brushing teeth) 3  -TS  -- 3  -MM    Eating meals 4  -TS  -- 3  -MM    Score 18  -TS  -- 14  -MM       Functional Assessment    Outcome Measure Options AM-PAC 6 Clicks Daily Activity (OT)  -TS AM-PAC 6 Clicks Basic Mobility (PT)  - AM-PAC 6 Clicks Daily Activity (OT)  -MM    Row Name 03/13/18 1039             How much help from another person do you currently need...    Turning from your back to your side while in flat bed without using bedrails? 3  -PB      Moving from lying on  back to sitting on the side of a flat bed without bedrails? 3  -PB      Moving to and from a bed to a chair (including a wheelchair)? 3  -PB      Standing up from a chair using your arms (e.g., wheelchair, bedside chair)? 3  -PB      Climbing 3-5 steps with a railing? 3  -PB      To walk in hospital room? 3  -PB      AM-PAC 6 Clicks Score 18  -PB         Functional Assessment    Outcome Measure Options AM-PAC 6 Clicks Basic Mobility (PT)  -PB        User Key  (r) = Recorded By, (t) = Taken By, (c) = Cosigned By    Initials Name Provider Type    AH Clarissa Lopez, PTA Physical Therapy Assistant    TS DAVID JorgeA/L Occupational Therapy Assistant    LEONARD Archibald, PRATIBHA DPT Physical Therapist    MM Hector Vera OTR/L Occupational Therapist          Therapy Charges for Today     Code Description Service Date Service Provider Modifiers Qty    40702792920 HC OT SELF CARE/MGMT/TRAIN EA 15 MIN 3/14/2018 BAKARI Jorge/L GO, KX 2          OT Discharge Summary  Reason for Discharge: Discharge from facility  Outcomes Achieved: Refer to plan of care for updates on goals achieved  Discharge Destination: Home with assist, Home with home health      BAKARI Lopez/KELLY  3/15/2018

## 2018-03-15 NOTE — THERAPY DISCHARGE NOTE
Acute Care - Physical Therapy Discharge Summary  [facility]       Patient Name: Denver G Morris  : 1935  MRN: [id number]    Today's Date: 3/15/2018  Onset of Illness/Injury or Date of Surgery: 18    Date of Referral to PT: 18  Referring Physician: Dr. Larsen      Admit Date: 3/12/2018      PT Recommendation and Plan    Visit Dx:    ICD-10-CM ICD-9-CM   1. Impaired gait and mobility R26.89 781.2   2. Impaired mobility and ADLs Z74.09 799.89             Outcome Measures     Row Name 18 1145 18 1100 18 1300       How much help from another person do you currently need...    Turning from your back to your side while in flat bed without using bedrails?  -- 3  -AH  --    Moving from lying on back to sitting on the side of a flat bed without bedrails?  -- 3  -AH  --    Moving to and from a bed to a chair (including a wheelchair)?  -- 3  -AH  --    Standing up from a chair using your arms (e.g., wheelchair, bedside chair)?  -- 3  -AH  --    Climbing 3-5 steps with a railing?  -- 2  -AH  --    To walk in hospital room?  -- 3  -AH  --    AM-PAC 6 Clicks Score  -- 17  -AH  --       How much help from another is currently needed...    Putting on and taking off regular lower body clothing? 2  -TS  -- 2  -MM    Bathing (including washing, rinsing, and drying) 3  -TS  -- 2  -MM    Toileting (which includes using toilet bed pan or urinal) 3  -TS  -- 2  -MM    Putting on and taking off regular upper body clothing 3  -TS  -- 2  -MM    Taking care of personal grooming (such as brushing teeth) 3  -TS  -- 3  -MM    Eating meals 4  -TS  -- 3  -MM    Score 18  -TS  -- 14  -MM       Functional Assessment    Outcome Measure Options AM-PAC 6 Clicks Daily Activity (OT)  -TS AM-PAC 6 Clicks Basic Mobility (PT)  -AH AM-PAC 6 Clicks Daily Activity (OT)  -MM    Row Name 18 1039             How much help from another person do you currently need...    Turning from your back to your side while in flat bed  without using bedrails? 3  -PB      Moving from lying on back to sitting on the side of a flat bed without bedrails? 3  -PB      Moving to and from a bed to a chair (including a wheelchair)? 3  -PB      Standing up from a chair using your arms (e.g., wheelchair, bedside chair)? 3  -PB      Climbing 3-5 steps with a railing? 3  -PB      To walk in hospital room? 3  -PB      AM-PAC 6 Clicks Score 18  -PB         Functional Assessment    Outcome Measure Options AM-PAC 6 Clicks Basic Mobility (PT)  -PB        User Key  (r) = Recorded By, (t) = Taken By, (c) = Cosigned By    Initials Name Provider Type    GAYLE Lopez, PTA Physical Therapy Assistant    TS Cecilia Mendoza VILLEGAS/L Occupational Therapy Assistant    PB Milad Archibald, PT DPT Physical Therapist    MM Hector Vera, OTR/L Occupational Therapist                      PT Rehab Goals     Row Name 03/15/18 0800 03/13/18 1039          Bed Mobility Goal 1 (PT)    Activity/Assistive Device (Bed Mobility Goal 1, PT)  -- bed mobility activities, all  -PB     [place] Level/Cues Needed (Bed Mobility Goal 1, PT)  -- independent  -PB     Time Frame (Bed Mobility Goal 1, PT)  -- long term goal (LTG);by discharge  -PB     Barriers (Bed Mobility Goal 1, PT)  -- spinal precautions  -PB     Progress/Outcomes (Bed Mobility Goal 1, PT) goal not met  - goal ongoing  -PB        Transfer Goal 1 (PT)    Activity/Assistive Device (Transfer Goal 1, PT)  -- sit-to-stand/stand-to-sit;bed-to-chair/chair-to-bed  -PB     [place] Level/Cues Needed (Transfer Goal 1, PT)  -- independent  -PB     Time Frame (Transfer Goal 1, PT)  -- long term goal (LTG);by discharge  -PB     Barriers (Transfers Goal 1, PT)  -- spinal precautions  -PB     Progress/Outcome (Transfer Goal 1, PT) goal not met  - goal ongoing  -PB        Gait Training Goal 1 (PT)    Activity/Assistive Device (Gait Training Goal 1, PT)  -- gait (walking locomotion);cane, quad  -PB     [place] Level  (Gait Training Goal 1, PT)  -- conditional independence  -PB     Distance (Gait Goal 1, PT)  -- 250  -PB     Time Frame (Gait Training Goal 1, PT)  -- long term goal (LTG);by discharge  -PB     Barriers (Gait Training Goal 1, PT)  -- spinal precautions, LE weakness  -PB     Progress/Outcome (Gait Training Goal 1, PT) goal not met  -AH goal ongoing  -PB        Stairs Goal 1 (PT)    Activity/Assistive Device (Stairs Goal 1, PT)  -- ascending stairs;descending stairs;cane, quad   and handrail  -PB     [place] Level/Cues Needed (Stairs Goal 1, PT)  -- contact guard assist  -PB     Number of Stairs (Stairs Goal 1, PT)  -- 5  -PB     Time Frame (Stairs Goal 1, PT)  -- long term goal (LTG);by discharge  -PB     Barriers (Stairs Goal 1, PT)  -- spinal precautions and LE weakness  -PB     Progress/Outcome (Stairs Goal 1, PT) goal not met  -AH goal ongoing  -PB        Patient Education Goal (PT)    Activity (Patient Education Goal, PT)  -- demonstrate irma/doff LSO, verbalize spinal precautions  -PB     [place]/Cues/Accuracy (Memory Goal 2, PT)  -- demonstrates adequately  -PB     Time Frame (Patient Education Goal, PT)  -- long term goal (LTG);by discharge  -PB     Barriers (Patient Education Goal, PT)  -- none  -PB     Progress/Outcome (Patient Education Goal, PT) goal not met  -AH goal ongoing  -PB       User Key  (r) = Recorded By, (t) = Taken By, (c) = Cosigned By    Initials Name Provider Type     Clarissa Lopez PTA Physical Therapy Assistant    LEONARD Archibald, PT DPT Physical Therapist          Therapy Charges for Today     Code Description Service Date Service Provider Modifiers Qty    54487367691 HC GAIT TRAINING EA 15 MIN 3/14/2018 Clarissa Lopez PTA GP, KX 1    [id number] HC GAIT TRAINING EA 15 MIN 3/14/2018 Clarissa Lopez PTA GP, KX 2          PT Discharge Summary  Reason for Discharge: Discharge from facility  Outcomes Achieved: Refer to plan of care for updates on goals achieved  Discharge  Destination: Home with home health      Clarissa Lopez, PTA   3/15/2018

## 2020-07-29 ENCOUNTER — HOSPITAL ENCOUNTER (EMERGENCY)
Facility: HOSPITAL | Age: 85
Discharge: HOME | End: 2020-07-29
Attending: EMERGENCY MEDICINE | Admitting: EMERGENCY MEDICINE

## 2020-07-29 ENCOUNTER — APPOINTMENT (OUTPATIENT)
Dept: CT IMAGING | Facility: HOSPITAL | Age: 85
End: 2020-07-29

## 2020-07-29 VITALS
OXYGEN SATURATION: 94 % | SYSTOLIC BLOOD PRESSURE: 161 MMHG | HEIGHT: 67 IN | TEMPERATURE: 98 F | BODY MASS INDEX: 27.47 KG/M2 | RESPIRATION RATE: 18 BRPM | DIASTOLIC BLOOD PRESSURE: 80 MMHG | WEIGHT: 175 LBS | HEART RATE: 81 BPM

## 2020-07-29 DIAGNOSIS — S27.321A CONTUSION OF LEFT LUNG, INITIAL ENCOUNTER: Primary | ICD-10-CM
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
## 2020-07-29 DIAGNOSIS — E87.1 HYPONATREMIA: ICD-10-CM

## 2020-07-29 LAB
ALBUMIN SERPL-MCNC: 4.2 G/DL (ref 3.5–5.2)
ALBUMIN/GLOB SERPL: 1.5 G/DL
ALP SERPL-CCNC: 68 U/L (ref 39–117)
ALT SERPL W P-5'-P-CCNC: 21 U/L (ref 1–41)
ANION GAP SERPL CALCULATED.3IONS-SCNC: 12 MMOL/L (ref 5–15)
APTT PPP: 30.7 SECONDS (ref 24.1–35)
AST SERPL-CCNC: 38 U/L (ref 1–40)
BASOPHILS # BLD AUTO: 0.06 10*3/MM3 (ref 0–0.2)
BASOPHILS NFR BLD AUTO: 0.4 % (ref 0–1.5)
BILIRUB SERPL-MCNC: 0.6 MG/DL (ref 0–1.2)
BUN SERPL-MCNC: 15 MG/DL (ref 8–23)
BUN/CREAT SERPL: 17.4 (ref 7–25)
CALCIUM SPEC-SCNC: 8.8 MG/DL (ref 8.6–10.5)
CHLORIDE SERPL-SCNC: 91 MMOL/L (ref 98–107)
CO2 SERPL-SCNC: 26 MMOL/L (ref 22–29)
CREAT SERPL-MCNC: 0.86 MG/DL (ref 0.76–1.27)
DEPRECATED RDW RBC AUTO: 43.2 FL (ref 37–54)
DIGOXIN SERPL-MCNC: 0.5 NG/ML (ref 0.6–1.2)
EOSINOPHIL # BLD AUTO: 0.05 10*3/MM3 (ref 0–0.4)
EOSINOPHIL NFR BLD AUTO: 0.4 % (ref 0.3–6.2)
ERYTHROCYTE [DISTWIDTH] IN BLOOD BY AUTOMATED COUNT: 12.4 % (ref 12.3–15.4)
GFR SERPL CREATININE-BSD FRML MDRD: 85 ML/MIN/1.73
GLOBULIN UR ELPH-MCNC: 2.8 GM/DL
GLUCOSE SERPL-MCNC: 174 MG/DL (ref 65–99)
HCT VFR BLD AUTO: 41.4 % (ref 37.5–51)
HGB BLD-MCNC: 14.5 G/DL (ref 13–17.7)
IMM GRANULOCYTES # BLD AUTO: 0.09 10*3/MM3 (ref 0–0.05)
IMM GRANULOCYTES NFR BLD AUTO: 0.7 % (ref 0–0.5)
INR PPP: 1.27 (ref 0.91–1.09)
LYMPHOCYTES # BLD AUTO: 0.93 10*3/MM3 (ref 0.7–3.1)
LYMPHOCYTES NFR BLD AUTO: 7 % (ref 19.6–45.3)
MCH RBC QN AUTO: 33 PG (ref 26.6–33)
MCHC RBC AUTO-ENTMCNC: 35 G/DL (ref 31.5–35.7)
MCV RBC AUTO: 94.3 FL (ref 79–97)
MONOCYTES # BLD AUTO: 0.85 10*3/MM3 (ref 0.1–0.9)
MONOCYTES NFR BLD AUTO: 6.4 % (ref 5–12)
NEUTROPHILS NFR BLD AUTO: 11.36 10*3/MM3 (ref 1.7–7)
NEUTROPHILS NFR BLD AUTO: 85.1 % (ref 42.7–76)
NRBC BLD AUTO-RTO: 0 /100 WBC (ref 0–0.2)
PLATELET # BLD AUTO: 183 10*3/MM3 (ref 140–450)
PMV BLD AUTO: 9.1 FL (ref 6–12)
POTASSIUM SERPL-SCNC: 4 MMOL/L (ref 3.5–5.2)
PROT SERPL-MCNC: 7 G/DL (ref 6–8.5)
PROTHROMBIN TIME: 15.5 SECONDS (ref 11.9–14.6)
RBC # BLD AUTO: 4.39 10*6/MM3 (ref 4.14–5.8)
SODIUM SERPL-SCNC: 129 MMOL/L (ref 136–145)
WBC # BLD AUTO: 13.34 10*3/MM3 (ref 3.4–10.8)

## 2020-07-29 PROCEDURE — 72128 CT CHEST SPINE W/O DYE: CPT

## 2020-07-29 PROCEDURE — 71260 CT THORAX DX C+: CPT

## 2020-07-29 PROCEDURE — 85730 THROMBOPLASTIN TIME PARTIAL: CPT | Performed by: EMERGENCY MEDICINE

## 2020-07-29 PROCEDURE — 90471 IMMUNIZATION ADMIN: CPT | Performed by: EMERGENCY MEDICINE

## 2020-07-29 PROCEDURE — 72131 CT LUMBAR SPINE W/O DYE: CPT

## 2020-07-29 PROCEDURE — 25010000002 TDAP 5-2.5-18.5 LF-MCG/0.5 SUSPENSION: Performed by: EMERGENCY MEDICINE

## 2020-07-29 PROCEDURE — 70450 CT HEAD/BRAIN W/O DYE: CPT

## 2020-07-29 PROCEDURE — 80053 COMPREHEN METABOLIC PANEL: CPT | Performed by: EMERGENCY MEDICINE

## 2020-07-29 PROCEDURE — 80162 ASSAY OF DIGOXIN TOTAL: CPT | Performed by: EMERGENCY MEDICINE

## 2020-07-29 PROCEDURE — 74177 CT ABD & PELVIS W/CONTRAST: CPT

## 2020-07-29 PROCEDURE — 85610 PROTHROMBIN TIME: CPT | Performed by: EMERGENCY MEDICINE

## 2020-07-29 PROCEDURE — 96374 THER/PROPH/DIAG INJ IV PUSH: CPT

## 2020-07-29 PROCEDURE — 85025 COMPLETE CBC W/AUTO DIFF WBC: CPT | Performed by: EMERGENCY MEDICINE

## 2020-07-29 PROCEDURE — 25010000002 IOPAMIDOL 61 % SOLUTION: Performed by: EMERGENCY MEDICINE

## 2020-07-29 PROCEDURE — 90715 TDAP VACCINE 7 YRS/> IM: CPT | Performed by: EMERGENCY MEDICINE

## 2020-07-29 PROCEDURE — 72125 CT NECK SPINE W/O DYE: CPT

## 2020-07-29 PROCEDURE — 99284 EMERGENCY DEPT VISIT MOD MDM: CPT

## 2020-07-29 PROCEDURE — 25010000002 MORPHINE SULFATE (PF) 2 MG/ML SOLUTION: Performed by: EMERGENCY MEDICINE

## 2020-07-29 RX ORDER — MORPHINE SULFATE 2 MG/ML
2 INJECTION, SOLUTION INTRAMUSCULAR; INTRAVENOUS ONCE
Status: COMPLETED | OUTPATIENT
Start: 2020-07-29 | End: 2020-07-29

## 2020-07-29 RX ORDER — HYDROCODONE BITARTRATE AND ACETAMINOPHEN 5; 325 MG/1; MG/1
1 TABLET ORAL EVERY 6 HOURS PRN
Qty: 4 TABLET | Refills: 0 | Status: SHIPPED | OUTPATIENT
Start: 2020-07-29 | End: 2020-07-30

## 2020-07-29 RX ADMIN — IOPAMIDOL 100 ML: 612 INJECTION, SOLUTION INTRAVENOUS at 19:35

## 2020-07-29 RX ADMIN — MORPHINE SULFATE 2 MG: 2 INJECTION, SOLUTION INTRAMUSCULAR; INTRAVENOUS at 18:26

## 2020-07-29 RX ADMIN — TETANUS TOXOID, REDUCED DIPHTHERIA TOXOID AND ACELLULAR PERTUSSIS VACCINE, ADSORBED 0.5 ML: 5; 2.5; 8; 8; 2.5 SUSPENSION INTRAMUSCULAR at 18:27

## 2020-10-13 ENCOUNTER — HOSPITAL ENCOUNTER (EMERGENCY)
Facility: HOSPITAL | Age: 85
Discharge: HOME | End: 2020-10-13
Attending: EMERGENCY MEDICINE | Admitting: EMERGENCY MEDICINE

## 2020-10-13 ENCOUNTER — APPOINTMENT (OUTPATIENT)
Dept: GENERAL RADIOLOGY | Facility: HOSPITAL | Age: 85
End: 2020-10-13

## 2020-10-13 ENCOUNTER — APPOINTMENT (OUTPATIENT)
Dept: CT IMAGING | Facility: HOSPITAL | Age: 85
End: 2020-10-13

## 2020-10-13 VITALS
TEMPERATURE: 97.6 F | HEIGHT: 67 IN | SYSTOLIC BLOOD PRESSURE: 156 MMHG | RESPIRATION RATE: 18 BRPM | BODY MASS INDEX: 26.68 KG/M2 | HEART RATE: 77 BPM | OXYGEN SATURATION: 98 % | DIASTOLIC BLOOD PRESSURE: 92 MMHG | WEIGHT: 170 LBS

## 2020-10-13 DIAGNOSIS — M48.061 SPINAL STENOSIS OF LUMBAR REGION WITHOUT NEUROGENIC CLAUDICATION: ICD-10-CM

## 2020-10-13 DIAGNOSIS — M54.32 SCIATICA OF LEFT SIDE: Primary | ICD-10-CM

## 2020-10-13 PROCEDURE — 99283 EMERGENCY DEPT VISIT LOW MDM: CPT

## 2020-10-13 PROCEDURE — 96372 THER/PROPH/DIAG INJ SC/IM: CPT

## 2020-10-13 PROCEDURE — 73502 X-RAY EXAM HIP UNI 2-3 VIEWS: CPT

## 2020-10-13 PROCEDURE — 25010000002 DEXAMETHASONE PER 1 MG: Performed by: EMERGENCY MEDICINE

## 2020-10-13 PROCEDURE — 25010000002 MORPHINE PER 10 MG: Performed by: EMERGENCY MEDICINE

## 2020-10-13 PROCEDURE — 72131 CT LUMBAR SPINE W/O DYE: CPT

## 2020-10-13 RX ORDER — SPIRONOLACTONE 25 MG/1
25 TABLET ORAL DAILY
COMMUNITY

## 2020-10-13 RX ORDER — LISINOPRIL 20 MG/1
30 TABLET ORAL DAILY
COMMUNITY

## 2020-10-13 RX ORDER — DEXAMETHASONE SODIUM PHOSPHATE 10 MG/ML
10 INJECTION INTRAMUSCULAR; INTRAVENOUS ONCE
Status: COMPLETED | OUTPATIENT
Start: 2020-10-13 | End: 2020-10-13

## 2020-10-13 RX ORDER — HYDROCODONE BITARTRATE AND ACETAMINOPHEN 7.5; 325 MG/1; MG/1
1 TABLET ORAL EVERY 8 HOURS PRN
Qty: 8 TABLET | Refills: 0 | Status: SHIPPED | OUTPATIENT
Start: 2020-10-13

## 2020-10-13 RX ADMIN — MORPHINE SULFATE 4 MG: 4 INJECTION, SOLUTION INTRAMUSCULAR; INTRAVENOUS at 13:21

## 2020-10-13 RX ADMIN — DEXAMETHASONE SODIUM PHOSPHATE 10 MG: 10 INJECTION INTRAMUSCULAR; INTRAVENOUS at 15:18

## 2020-10-13 NOTE — ED PROVIDER NOTES
Subjective   Patient is an 85-year-old male who presents to the ER with back and hip pain.  Patient states he worked outside approximately 1 week ago and came inside and turned and has had left lower back pain radiating to his left hip since that time.  Patient describes it as a sharp pain and is worse with movement.  Patient states he did hear a pop when the pain began.  He has not been taking medication at home for pain.  Patient states the pain has progressively worsened.  He denies any fever, chest pain, shortness of air, abdominal pain, nausea vomiting diarrhea, urinary changes, neurologic changes, direct trauma, saddle anesthesia.          Review of Systems   Constitutional: Negative.    HENT: Negative.    Eyes: Negative.    Respiratory: Negative.    Cardiovascular: Negative.    Gastrointestinal: Negative.    Endocrine: Negative.    Genitourinary: Negative.    Musculoskeletal: Positive for arthralgias, back pain and myalgias.   Skin: Negative.    Allergic/Immunologic: Negative.    Neurological: Negative.    Hematological: Negative.    Psychiatric/Behavioral: Negative.    All other systems reviewed and are negative.      Past Medical History:   Diagnosis Date   • Arthritis    • Atrial fibrillation (CMS/HCC)     HX OF SINCE 2000   • Hiatal hernia    • Hypertension        Allergies   Allergen Reactions   • Ace Inhibitors Cough       Past Surgical History:   Procedure Laterality Date   • APPENDECTOMY     • CERVICAL FUSION     • CHOLECYSTECTOMY     • COLONOSCOPY W/ BIOPSIES AND POLYPECTOMY     • HERNIA REPAIR      X 2   • JOINT REPLACEMENT Right    • LUMBAR FUSION Left 3/12/2018    Procedure: LEFT LATERAL LUMBAR  INTERBODY FUSION L3-5;  Surgeon: LINDA Larsen MD;  Location: [facility];  Service: Orthopedic Spine       History reviewed. No pertinent family history.    Social History     Socioeconomic History   • Marital status:      Spouse name: Not on file   • Number of children: Not on file   • Years of  education: Not on file   • Highest education level: Not on file   Tobacco Use   • Smoking status: Never Smoker   • Smokeless tobacco: Never Used   Substance and Sexual Activity   • Alcohol use: No   • Drug use: No   • Sexual activity: Defer           Objective   Physical Exam  Vitals signs and nursing note reviewed.   Constitutional:       Appearance: He is well-developed.   HENT:      Head: Normocephalic and atraumatic.   Eyes:      Conjunctiva/sclera: Conjunctivae normal.      Pupils: Pupils are equal, round, and reactive to light.   Neck:      Musculoskeletal: Normal range of motion.   Cardiovascular:      Rate and Rhythm: Normal rate and regular rhythm.      Heart sounds: Normal heart sounds.   Pulmonary:      Effort: Pulmonary effort is normal.      Breath sounds: Normal breath sounds.   Abdominal:      Palpations: Abdomen is soft.      Tenderness: There is no abdominal tenderness. There is no right CVA tenderness, left CVA tenderness, guarding or rebound.   Musculoskeletal: Normal range of motion.         General: No deformity.      Comments: Tender to palpation left SI joint, no midline CT or L-spine tenderness, nontender to palpation elsewhere, normal range of motion, neurovascular intact   Skin:     General: Skin is warm.   Neurological:      Mental Status: He is alert and oriented to person, place, and time.      Sensory: Sensation is intact.      Motor: Motor function is intact.      Coordination: Coordination is intact.   Psychiatric:         Behavior: Behavior normal.         Procedures           ED Course      Patient was given morphine and decadron.  Improving.    XR Hip With or Without Pelvis 2 - 3 View Left   Final Result   Impression:    1. No acute osseous injury or malalignment at the hip joints. Bilateral   moderate hip joint osteoarthritis.       This report was finalized on 10/13/2020 14:28 by Dr Collin Russell, .      CT Lumbar Spine Without Contrast   Final Result   Impression:    1. No acute  osseous injury identified.   2. 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 No space or subsidence.   3. High-grade acquired spinal stenosis at L2-L3 and likely the L4-5   level as well.   4. Multilevel neuroforaminal narrowing.           This report was finalized on 10/13/2020 14:14 by Dr Collin Russell, .        X-ray of the left hip showed arthritis.  CT scan of the L-spine showed no acute findings.  Did show degenerative retrolisthesis at multiple levels and spinal stenosis.  Patient had no signs of cauda equina.  Patient will be discharged home with a short course of Lortab and is to follow-up with Dr. Larsen as soon as possible.  He is return to the ER immediately for any worsening pain, numbness, weakness or other concerns.  Patient agreeable.  Work-up most consistent with spinal stenosis and sciatica.     JOSE G query complete. Treatment plan to include limited course of prescribed  controlled substance. Risks including addiction, benefits, and alternatives presented to patient.                                 MDM    Final diagnoses:   Sciatica of left side   Spinal stenosis of lumbar region without neurogenic claudication            Bree Robert MD  10/13/20 3022

## 2021-01-01 ENCOUNTER — TRANSCRIBE ORDERS (OUTPATIENT)
Dept: ADMINISTRATIVE | Facility: HOSPITAL | Age: 86
End: 2021-01-01

## 2021-01-01 ENCOUNTER — HOSPITAL ENCOUNTER (OUTPATIENT)
Dept: GENERAL RADIOLOGY | Facility: HOSPITAL | Age: 86
Discharge: HOME | End: 2021-03-23
Admitting: SPECIALIST

## 2021-01-01 DIAGNOSIS — R10.30 LOWER ABDOMINAL PAIN: Primary | ICD-10-CM

## 2021-01-01 PROCEDURE — 74270 X-RAY XM COLON 1CNTRST STD: CPT

## 2021-01-01 PROCEDURE — 63710000001 BARIUM SULFATE 105 % SUSPENSION: Performed by: SPECIALIST

## 2021-01-01 PROCEDURE — A9270 NON-COVERED ITEM OR SERVICE: HCPCS | Performed by: SPECIALIST

## 2021-01-01 RX ADMIN — BARIUM SULFATE 650 ML: 1.05 SUSPENSION ORAL; RECTAL at 11:21

## (undated) DEVICE — GLV SURG NEOLON 2G PF LF 8 STRL

## (undated) DEVICE — 4-PORT MANIFOLD: Brand: NEPTUNE 2

## (undated) DEVICE — KT MONTR EMG/SSEP TIMBERLINE GEN2 LD2.5M

## (undated) DEVICE — GOWN,NON-REINFORCED,SIRUS,SET IN SLV,XXL: Brand: MEDLINE

## (undated) DEVICE — DRSNG WND SIL OPTIFOAM GNTL BRDR ADHS 1.6X2IN

## (undated) DEVICE — PK TURNOVER RM ADV

## (undated) DEVICE — ANTIBACTERIAL UNDYED BRAIDED (POLYGLACTIN 910), SYNTHETIC ABSORBABLE SUTURE: Brand: COATED VICRYL

## (undated) DEVICE — GLV SURG BIOGEL LTX PF 6 1/2

## (undated) DEVICE — KWIRE TIMBERLINE BLNT
Type: IMPLANTABLE DEVICE | Site: SPINE LUMBAR | Status: NON-FUNCTIONAL
Removed: 2018-03-12

## (undated) DEVICE — CATH IV ANGIO FEP 12G 3IN LTBLU 10PK

## (undated) DEVICE — PK SPINE LAT 30

## (undated) DEVICE — GLV SURG TRIUMPH GREEN W/ALOE PF LTX 7 STRL

## (undated) DEVICE — GLV SURG NEOLON 2G PF LF 7.5 STRL

## (undated) DEVICE — ELECTRD BLD EDGE/INSUL1P 2.4X5.1MM STRL

## (undated) DEVICE — TP SILK DURAPORE 3IN

## (undated) DEVICE — GLV SURG TRIUMPH GREEN W/ALOE PF LTX 8 STRL

## (undated) DEVICE — KT ACC LAT EMG/SSEP TIMBERLINE GEN2

## (undated) DEVICE — GLV SURG BIOGEL LTX PF 7 1/2